# Patient Record
Sex: MALE | Race: WHITE | NOT HISPANIC OR LATINO | Employment: OTHER | URBAN - NONMETROPOLITAN AREA
[De-identification: names, ages, dates, MRNs, and addresses within clinical notes are randomized per-mention and may not be internally consistent; named-entity substitution may affect disease eponyms.]

---

## 2020-04-17 ENCOUNTER — APPOINTMENT (OUTPATIENT)
Dept: GENERAL RADIOLOGY | Facility: HOSPITAL | Age: 75
End: 2020-04-17
Attending: EMERGENCY MEDICINE
Payer: MEDICARE

## 2020-04-17 ENCOUNTER — ANESTHESIA EVENT (OUTPATIENT)
Dept: EMERGENCY MEDICINE | Facility: HOSPITAL | Age: 75
End: 2020-04-17
Payer: MEDICARE

## 2020-04-17 ENCOUNTER — APPOINTMENT (OUTPATIENT)
Dept: CT IMAGING | Facility: HOSPITAL | Age: 75
End: 2020-04-17
Attending: EMERGENCY MEDICINE
Payer: MEDICARE

## 2020-04-17 ENCOUNTER — HOSPITAL ENCOUNTER (EMERGENCY)
Facility: HOSPITAL | Age: 75
Discharge: HOME | End: 2020-04-17
Attending: EMERGENCY MEDICINE | Admitting: EMERGENCY MEDICINE
Payer: MEDICARE

## 2020-04-17 ENCOUNTER — ANESTHESIA (OUTPATIENT)
Dept: EMERGENCY MEDICINE | Facility: HOSPITAL | Age: 75
End: 2020-04-17
Payer: MEDICARE

## 2020-04-17 VITALS
DIASTOLIC BLOOD PRESSURE: 78 MMHG | OXYGEN SATURATION: 94 % | RESPIRATION RATE: 20 BRPM | HEART RATE: 53 BPM | SYSTOLIC BLOOD PRESSURE: 147 MMHG

## 2020-04-17 DIAGNOSIS — S43.004A DISLOCATION OF RIGHT SHOULDER JOINT, INITIAL ENCOUNTER: ICD-10-CM
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
## 2020-04-17 DIAGNOSIS — S09.90XA CLOSED HEAD INJURY, INITIAL ENCOUNTER: ICD-10-CM
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
## 2020-04-17 LAB
ALBUMIN SERPL-MCNC: 3.7 G/DL (ref 3.4–5)
ALP SERPL-CCNC: 70 U/L (ref 40–150)
ALT SERPL W P-5'-P-CCNC: 41 U/L (ref 0–70)
ANION GAP SERPL CALCULATED.3IONS-SCNC: 5 MMOL/L (ref 3–14)
AST SERPL W P-5'-P-CCNC: 37 U/L (ref 0–45)
BASOPHILS # BLD AUTO: 0.1 10E9/L (ref 0–0.2)
BASOPHILS NFR BLD AUTO: 0.7 %
BILIRUB SERPL-MCNC: 0.7 MG/DL (ref 0.2–1.3)
BUN SERPL-MCNC: 22 MG/DL (ref 7–30)
CALCIUM SERPL-MCNC: 8.2 MG/DL (ref 8.5–10.1)
CHLORIDE SERPL-SCNC: 112 MMOL/L (ref 94–109)
CO2 SERPL-SCNC: 24 MMOL/L (ref 20–32)
CREAT SERPL-MCNC: 1.17 MG/DL (ref 0.66–1.25)
DIFFERENTIAL METHOD BLD: ABNORMAL
EOSINOPHIL # BLD AUTO: 0.2 10E9/L (ref 0–0.7)
EOSINOPHIL NFR BLD AUTO: 2.5 %
ERYTHROCYTE [DISTWIDTH] IN BLOOD BY AUTOMATED COUNT: 13.5 % (ref 10–15)
GFR SERPL CREATININE-BSD FRML MDRD: 61 ML/MIN/{1.73_M2}
GLUCOSE BLDC GLUCOMTR-MCNC: 137 MG/DL (ref 70–99)
GLUCOSE SERPL-MCNC: 147 MG/DL (ref 70–99)
HCT VFR BLD AUTO: 40.8 % (ref 40–53)
HGB BLD-MCNC: 14.5 G/DL (ref 13.3–17.7)
IMM GRANULOCYTES # BLD: 0 10E9/L (ref 0–0.4)
IMM GRANULOCYTES NFR BLD: 0.3 %
INR PPP: 1.04 (ref 0.86–1.14)
LACTATE BLD-SCNC: 1.9 MMOL/L (ref 0.7–2)
LYMPHOCYTES # BLD AUTO: 2.5 10E9/L (ref 0.8–5.3)
LYMPHOCYTES NFR BLD AUTO: 35.4 %
MCH RBC QN AUTO: 34.4 PG (ref 26.5–33)
MCHC RBC AUTO-ENTMCNC: 35.5 G/DL (ref 31.5–36.5)
MCV RBC AUTO: 97 FL (ref 78–100)
MONOCYTES # BLD AUTO: 0.7 10E9/L (ref 0–1.3)
MONOCYTES NFR BLD AUTO: 10 %
NEUTROPHILS # BLD AUTO: 3.6 10E9/L (ref 1.6–8.3)
NEUTROPHILS NFR BLD AUTO: 51.1 %
NRBC # BLD AUTO: 0 10*3/UL
NRBC BLD AUTO-RTO: 0 /100
PLATELET # BLD AUTO: 189 10E9/L (ref 150–450)
POTASSIUM SERPL-SCNC: 3.6 MMOL/L (ref 3.4–5.3)
PROT SERPL-MCNC: 6.8 G/DL (ref 6.8–8.8)
RBC # BLD AUTO: 4.21 10E12/L (ref 4.4–5.9)
SODIUM SERPL-SCNC: 141 MMOL/L (ref 133–144)
WBC # BLD AUTO: 7.1 10E9/L (ref 4–11)

## 2020-04-17 PROCEDURE — 99203 OFFICE O/P NEW LOW 30 MIN: CPT | Performed by: SURGERY

## 2020-04-17 PROCEDURE — 23655 CLTX SHO DSLC W/MNPJ W/ANES: CPT | Mod: RT

## 2020-04-17 PROCEDURE — 68300002 ZZH FULL TRAUMA W/O CC LEVEL III

## 2020-04-17 PROCEDURE — 83605 ASSAY OF LACTIC ACID: CPT | Performed by: EMERGENCY MEDICINE

## 2020-04-17 PROCEDURE — 36415 COLL VENOUS BLD VENIPUNCTURE: CPT | Performed by: EMERGENCY MEDICINE

## 2020-04-17 PROCEDURE — 25000132 ZZH RX MED GY IP 250 OP 250 PS 637: Mod: GY | Performed by: EMERGENCY MEDICINE

## 2020-04-17 PROCEDURE — 70450 CT HEAD/BRAIN W/O DYE: CPT | Mod: TC

## 2020-04-17 PROCEDURE — 40000986 XR SHOULDER RT PORT G/E 2 VW

## 2020-04-17 PROCEDURE — 85025 COMPLETE CBC W/AUTO DIFF WBC: CPT | Performed by: EMERGENCY MEDICINE

## 2020-04-17 PROCEDURE — 99285 EMERGENCY DEPT VISIT HI MDM: CPT | Mod: 25

## 2020-04-17 PROCEDURE — 85610 PROTHROMBIN TIME: CPT | Mod: GZ | Performed by: EMERGENCY MEDICINE

## 2020-04-17 PROCEDURE — 99283 EMERGENCY DEPT VISIT LOW MDM: CPT | Mod: Z6 | Performed by: EMERGENCY MEDICINE

## 2020-04-17 PROCEDURE — 23655 CLTX SHO DSLC W/MNPJ W/ANES: CPT | Mod: 54 | Performed by: EMERGENCY MEDICINE

## 2020-04-17 PROCEDURE — 80053 COMPREHEN METABOLIC PANEL: CPT | Performed by: EMERGENCY MEDICINE

## 2020-04-17 PROCEDURE — 73020 X-RAY EXAM OF SHOULDER: CPT | Mod: TC,RT,76

## 2020-04-17 PROCEDURE — 72125 CT NECK SPINE W/O DYE: CPT | Mod: TC

## 2020-04-17 PROCEDURE — 73020 X-RAY EXAM OF SHOULDER: CPT | Mod: TC,RT

## 2020-04-17 PROCEDURE — 99100 ANES PT EXTEME AGE<1 YR&>70: CPT | Performed by: NURSE ANESTHETIST, CERTIFIED REGISTERED

## 2020-04-17 PROCEDURE — 00000146 ZZHCL STATISTIC GLUCOSE BY METER IP

## 2020-04-17 PROCEDURE — 23700 MNPJ ANES SHO JT FIXJ APRATS: CPT | Performed by: NURSE ANESTHETIST, CERTIFIED REGISTERED

## 2020-04-17 RX ORDER — VALSARTAN 80 MG/1
80 TABLET ORAL
COMMUNITY
Start: 2019-10-28

## 2020-04-17 RX ORDER — ACETAMINOPHEN 325 MG/1
650 TABLET ORAL ONCE
Status: COMPLETED | OUTPATIENT
Start: 2020-04-17 | End: 2020-04-17

## 2020-04-17 RX ORDER — ROSUVASTATIN CALCIUM 20 MG/1
20 TABLET, COATED ORAL
COMMUNITY
Start: 2019-10-25

## 2020-04-17 RX ORDER — NITROGLYCERIN 0.4 MG/1
0.4 TABLET SUBLINGUAL EVERY 5 MIN PRN
COMMUNITY
Start: 2018-10-12

## 2020-04-17 RX ORDER — PROPOFOL 10 MG/ML
INJECTION, EMULSION INTRAVENOUS
Status: DISCONTINUED
Start: 2020-04-17 | End: 2020-04-18 | Stop reason: HOSPADM

## 2020-04-17 RX ORDER — METOPROLOL SUCCINATE 25 MG/1
25 TABLET, EXTENDED RELEASE ORAL
COMMUNITY
Start: 2020-01-30

## 2020-04-17 RX ADMIN — ACETAMINOPHEN 650 MG: 325 TABLET, FILM COATED ORAL at 21:23

## 2020-04-17 NOTE — ED TRIAGE NOTES
Pt brought to the ER via [facility]. Pt on blood thinners. Fall from running. Right shoulder not in alignment. 100mcg fentanyl and 1 mg versed.

## 2020-04-17 NOTE — ED AVS SNAPSHOT
[facility]  [address] [address]  Phone:  [phone]                                    Devante Vital   MRN: [id number]    Department:  [facility]   Date of Visit:  4/17/2020           After Visit Summary Signature Page    I have received my discharge instructions, and my questions have been answered. I have discussed any challenges I see with this plan with the nurse or doctor.    ..........................................................................................................................................  Patient/Patient Representative Signature      ..........................................................................................................................................  Patient Representative Print Name and Relationship to Patient    ..................................................               ................................................  Date                                   Time    ..........................................................................................................................................  Reviewed by Signature/Title    ...................................................              ..............................................  Date                                               Time          22EPIC Rev 08/18

## 2020-04-18 ASSESSMENT — ENCOUNTER SYMPTOMS
ENDOCRINE NEGATIVE: 1
CARDIOVASCULAR NEGATIVE: 1
RESPIRATORY NEGATIVE: 1
GASTROINTESTINAL NEGATIVE: 1
FEVER: 0
CONSTITUTIONAL NEGATIVE: 1
NECK PAIN: 0
EYES NEGATIVE: 1
NECK STIFFNESS: 0
MYALGIAS: 0
HEMATOLOGIC/LYMPHATIC NEGATIVE: 1
CHILLS: 0
NEUROLOGICAL NEGATIVE: 1
PSYCHIATRIC NEGATIVE: 1
ALLERGIC/IMMUNOLOGIC NEGATIVE: 1
MUSCULOSKELETAL NEGATIVE: 1

## 2020-04-18 NOTE — ED PROVIDER NOTES
History     Chief Complaint   Patient presents with     Fall     HPI  Devante Vital is a 74 year old male who presents today with complaints of a fall.  Patient states that he was in his driveway chasing his wife when he fell to the floor.  There was no loss of consciousness.  Patient however injured his arm in the process.  Patient brought in by ambulance on a backboard board with his right arm stretched over the top of his head.  Patient stated he was unable to move his arm.  No previous episodes.  Patient otherwise has been at baseline state of health.  Allergies:  Allergies   Allergen Reactions     Lisinopril Cough       Problem List:    There are no active problems to display for this patient.       Past Medical History:    No past medical history on file.    Past Surgical History:    No past surgical history on file.    Family History:    No family history on file.    Social History:  Marital Status:   [2]  Social History     Tobacco Use     Smoking status: Not on file   Substance Use Topics     Alcohol use: Not on file     Drug use: Not on file        Medications:    metoprolol succinate ER (TOPROL-XL) 25 MG 24 hr tablet  nitroGLYcerin (NITROSTAT) 0.4 MG sublingual tablet  rosuvastatin (CRESTOR) 40 MG tablet  valsartan (DIOVAN) 80 MG tablet          Review of Systems   Constitutional: Negative.  Negative for chills and fever.   HENT: Negative.    Eyes: Negative.    Respiratory: Negative.    Cardiovascular: Negative.    Gastrointestinal: Negative.    Endocrine: Negative.    Genitourinary: Negative.    Musculoskeletal: Negative.  Negative for myalgias, neck pain and neck stiffness.   Skin: Negative.    Allergic/Immunologic: Negative.    Neurological: Negative.    Hematological: Negative.    Psychiatric/Behavioral: Negative.        Physical Exam   BP: 131/80  Pulse: 52  Heart Rate: 52  Resp: (!) 8  SpO2: 93 %      Physical Exam  Constitutional:       General: He is not in acute distress.      Appearance: He is normal weight. He is not toxic-appearing.   HENT:      Head: Normocephalic.      Comments: Abrasion noted over left forehead  Neck:      Comments: Patient in c-collar  Cardiovascular:      Rate and Rhythm: Normal rate and regular rhythm.   Pulmonary:      Effort: Pulmonary effort is normal. No respiratory distress.      Breath sounds: Normal breath sounds. No wheezing or rales.   Abdominal:      General: Abdomen is flat. There is no distension.      Tenderness: There is no abdominal tenderness. There is no right CVA tenderness, left CVA tenderness or guarding.   Musculoskeletal:      Comments: Arm held extended above shoulder with palpable bony protrusion noted in right axilla   Skin:     General: Skin is warm.      Capillary Refill: Capillary refill takes less than 2 seconds.   Neurological:      Mental Status: He is alert.   Psychiatric:         Mood and Affect: Mood normal.         Behavior: Behavior normal.         Thought Content: Thought content normal.         Judgment: Judgment normal.         ED Course         Results for orders placed or performed during the hospital encounter of 04/17/20 (from the past 24 hour(s))   CBC with platelets differential   Result Value Ref Range    WBC 7.1 4.0 - 11.0 10e9/L    RBC Count 4.21 (L) 4.4 - 5.9 10e12/L    Hemoglobin 14.5 13.3 - 17.7 g/dL    Hematocrit 40.8 40.0 - 53.0 %    MCV 97 78 - 100 fl    MCH 34.4 (H) 26.5 - 33.0 pg    MCHC 35.5 31.5 - 36.5 g/dL    RDW 13.5 10.0 - 15.0 %    Platelet Count 189 150 - 450 10e9/L    Diff Method Automated Method     % Neutrophils 51.1 %    % Lymphocytes 35.4 %    % Monocytes 10.0 %    % Eosinophils 2.5 %    % Basophils 0.7 %    % Immature Granulocytes 0.3 %    Nucleated RBCs 0 0 /100    Absolute Neutrophil 3.6 1.6 - 8.3 10e9/L    Absolute Lymphocytes 2.5 0.8 - 5.3 10e9/L    Absolute Monocytes 0.7 0.0 - 1.3 10e9/L    Absolute Eosinophils 0.2 0.0 - 0.7 10e9/L    Absolute Basophils 0.1 0.0 - 0.2 10e9/L    Abs Immature  Granulocytes 0.0 0 - 0.4 10e9/L    Absolute Nucleated RBC 0.0    INR   Result Value Ref Range    INR 1.04 0.86 - 1.14   Comprehensive metabolic panel   Result Value Ref Range    Sodium 141 133 - 144 mmol/L    Potassium 3.6 3.4 - 5.3 mmol/L    Chloride 112 (H) 94 - 109 mmol/L    Carbon Dioxide 24 20 - 32 mmol/L    Anion Gap 5 3 - 14 mmol/L    Glucose 147 (H) 70 - 99 mg/dL    Urea Nitrogen 22 7 - 30 mg/dL    Creatinine 1.17 0.66 - 1.25 mg/dL    GFR Estimate 61 >60 mL/min/[1.73_m2]    GFR Estimate If Black 71 >60 mL/min/[1.73_m2]    Calcium 8.2 (L) 8.5 - 10.1 mg/dL    Bilirubin Total 0.7 0.2 - 1.3 mg/dL    Albumin 3.7 3.4 - 5.0 g/dL    Protein Total 6.8 6.8 - 8.8 g/dL    Alkaline Phosphatase 70 40 - 150 U/L    ALT 41 0 - 70 U/L    AST 37 0 - 45 U/L   Lactic acid whole blood   Result Value Ref Range    Lactic Acid 1.9 0.7 - 2.0 mmol/L   Glucose by meter   Result Value Ref Range    Glucose 137 (H) 70 - 99 mg/dL   CT Head w/o Contrast    Narrative    PROCEDURE: CT HEAD W/O CONTRAST     HISTORY: s/p trauma with head injury and dislocated shoulder. r/o ich.    COMPARISON: None.    TECHNIQUE:  Helical images of the head from the foramen magnum to the  vertex were obtained without contrast.    FINDINGS: The ventricles and sulci are normal in volume. No acute  intracranial hemorrhage, mass effect, midline shift, hydrocephalus or  basilar cystern effacement are present.    The grey-white matter interface is preserved.    The calvarium is intact. The mastoid air cells are clear.  The  visualized paranasal sinuses are clear.      Impression    IMPRESSION: Normal brain for age      REJI GODFREY MD   Cervical spine CT w/o contrast    Narrative    PROCEDURE: CT CERVICAL SPINE W/O CONTRAST 4/17/2020 7:01 PM    HISTORY: R/o fx, s/p fall wih complaints of head pain. R/o fx.    COMPARISONS: None.    Meds/Dose Given:    TECHNIQUE: CT scan of the cervical spine with sagittal coronal  reconstructions    FINDINGS: There are degenerative  changes at the middle atlantoaxial  joint. There is decrease in height in the C4-C5 C5-C6 and C6-C7 discs  with anterior posterior osteophytes and bilateral uncovertebral joint  spurs. Cervical facet joint degenerative changes are seen most severe  at C7-T1 on the left. There are no fractures of the vertebral bodies  or arches. The paravertebral soft tissues appear normal.         Impression    IMPRESSION: Degenerative changes of the cervical spine    REJI GODFREY MD   XR Shoulder Right Port 1 View    Narrative    PROCEDURE:  XR SHOULDER RT PORT 1 VW    HISTORY: pain. dislocation?; Fall    COMPARISON:  None.    TECHNIQUE:  1 views of the right shoulder were obtained.    FINDINGS:  A single view of the shoulder reveals the glenohumeral  articulation is questionably subluxed inferiorly the acromioclavicular  joint appears normal.       Impression    IMPRESSION: Possible glenohumeral subluxation additional views of the  shoulder recommended      REJI GODFREY MD   XR Shoulder Right Port 1 View    Narrative    PROCEDURE:  XR SHOULDER RT PORT 1 VW    HISTORY: put back in place?; Fall    COMPARISON:  None.    TECHNIQUE:  1 views of the right shoulder were obtained.    FINDINGS:  There are degenerative changes in chromic thickened joint.  A single projection the glenohumeral articulation appears normally  aligned. No fractures are noted.       Impression    IMPRESSION: Glenohumeral alignment appears anatomic in this single  projection      REJI GODFREY MD   XR Shoulder Right Port G/E 2 Views    Narrative    PROCEDURE:  XR SHOULDER RT PORT G/E 2 VW    HISTORY: Post reduction x-rays s/p fall. R/o fx    COMPARISON:  None.    TECHNIQUE:  2 views of the right shoulder were obtained.    FINDINGS:  The glenohumeral articulation is normally aligned.  Degenerative changes are seen in the acromio clavicular joint. The  scapula and proximal humerus are intact.       Impression    IMPRESSION: Normal glenohumeral alignment.  No bony fractures.      REJI GODFREY MD       Medications   propofol (DIPRIVAN) 1000 MG/100ML infusion (has no administration in time range)       Assessments & Plan (with Medical Decision Making)     Patient came in as a level 2 trauma.  Upon presentation patient was noted to have what appeared to be a dislocation of his right shoulder and was upgraded to level 1.  Anesthesia arrived and patient was given propofol with reduction of his right arm by MD.  Patient immediately taken to CT for stat head and neck CT.  Both were negative.      Patient was observed for period of time during which post-reduction x-rays of his right shoulder with and showing no fracture.  Patient is to be discharged home with close follow-up with PCP and in 12 to 24 hours.  Patient understands to return if he develops any new or worsening symptoms. Post discharge, patient with intact neurovascular in right arm. Patient placed in right arm sling and referred to ortho for follow up.     Due to the nature of this electronic medical record, laboratory results, imaging results, diagnosis, other information and medications reported above may not represent information available to me at the the time of my care and disposition. Medications reported above may have not been ordered by me.     Portions of the record may have been created with voice recognition software. Occasional wrong-word or 'sound-a- like' substitution may have occurred due to the inherent limitations of voice recognition software. Though the chart has been reviewed, there may be inadvertent transcription errors. Read the chart carefully and recognize, using context, where substitutions have occurred.       New Prescriptions    No medications on file       Final diagnoses:   Fall       4/17/2020   [facility]     Adarsh Rico MD  04/21/20 2045       Adarsh Rico MD  04/21/20 2050

## 2020-04-18 NOTE — ED NOTES
MD Babin at bedside, cleared C-spine.  MD Babin removed C-collar. Pt alert, and communicating.  Removed 4 LPM NC, and on RA.

## 2020-04-18 NOTE — CONSULTS
[facility]    Consult note: Trauma Service     Date of Admission:  4/17/2020    Time of Admission/Consult Request (page/call): 1823    Time of my evaluation: 1832  Consulting services: None    Assessment & Plan   Trauma mechanism: Fall while running  Time/date of injury: 4/17/2020 ~615  Known Injuries:  1. Right shoulder dislocation  2. Right frontal scalp abrasion  Other diagnoses:   None    Procedure: None    Plan:  The patient's right shoulder dislocation was reduced by the ER physician. There were no traumatic injuries noted on CT imaing. As there were no traumatic findings found on the CT scan and his shoulder was reduced he is stable from a trauma surgical standpoint and can be discharged.     ETOH: This patient was asked if in the last 3-6 months there has been a time when he had  5 or more drinks in a single day/outing.. Patient answer to the screening question was in the negative. No intervention needed.  Primary Care Physician   No primary care provider on file.    Chief Complaint   Right shoulder pain    History is obtained from the patient    History of Present Illness   Devante Vital is a 74 year old male who presents with right shoulder pain. He says that he was running from his wife and fell with an out stretched arm. He fell and hit his head. He is currently on blood thinners for previous cardiac stents. He had no loss of concousness, dizzines, or change in vision. He has no head or neck pain. He has no chest pain or shortness of breath. He has had no numbness or tingling to the right arm. He has had no numbness or tingling to the left arm and bilateral lower legs. The patient came into the ER as a level I trauma.     Past Medical History    I have reviewed this patient's medical history and updated it with pertinent information if needed.   No past medical history on file.    Past Surgical History   I have reviewed this patient's surgical history and updated it with pertinent  information if needed.  No past surgical history on file.  Prior to Admission Medications   Prior to Admission Medications   Prescriptions Last Dose Informant Patient Reported? Taking?   metoprolol succinate ER (TOPROL-XL) 25 MG 24 hr tablet Unknown at Unknown time  Yes No   Sig: Take 25 mg by mouth   nitroGLYcerin (NITROSTAT) 0.4 MG sublingual tablet Unknown at Unknown time  Yes No   Sig: DISSOLVE ONE TABLET UNDER THE TONGUE EVERY 5 MINUTES AS NEEDED FOR CHEST PAIN.  DO NOT EXCEED A TOTAL OF 3 DOSES IN 15 MINUTES   rosuvastatin (CRESTOR) 40 MG tablet Unknown at Unknown time  Yes No   Sig: TAKE 1/2 (ONE-HALF) TABLET BY MOUTH ONCE DAILY   valsartan (DIOVAN) 80 MG tablet Unknown at Unknown time  Yes No   Sig: Take 80 mg by mouth      Facility-Administered Medications: None     Allergies   Allergies   Allergen Reactions     Lisinopril Cough       Social History   Social History     Socioeconomic History     Marital status:      Spouse name: Not on file     Number of children: Not on file     Years of education: Not on file     Highest education level: Not on file   Occupational History     Not on file   Social Needs     Financial resource strain: Not on file     Food insecurity     Worry: Not on file     Inability: Not on file     Transportation needs     Medical: Not on file     Non-medical: Not on file   Tobacco Use     Smoking status: Not on file   Substance and Sexual Activity     Alcohol use: Not on file     Drug use: Not on file     Sexual activity: Not on file   Lifestyle     Physical activity     Days per week: Not on file     Minutes per session: Not on file     Stress: Not on file   Relationships     Social connections     Talks on phone: Not on file     Gets together: Not on file     Attends Spiritism service: Not on file     Active member of club or organization: Not on file     Attends meetings of clubs or organizations: Not on file     Relationship status: Not on file     Intimate partner violence      Fear of current or ex partner: Not on file     Emotionally abused: Not on file     Physically abused: Not on file     Forced sexual activity: Not on file   Other Topics Concern     Not on file   Social History Narrative     Not on file       Family History   I have reviewed this patient's family history and updated it with pertinent information if needed.   No family history on file.    Review of Systems   CONSTITUTIONAL: No fever, chills, sweats, fatigue   EYES: no visual blurring, no double vision or visual loss  ENT: no decrease in hearing, no tinnitus, no vertigo, no hoarseness  RESPIRATORY: no shortness of breath, no cough, no sputum   CARDIOVASCULAR: no palpitations, no chest  pain, no exertional chest pain or pressure  GASTROINTESTINAL: no nausea or vomiting, or abd pain  GENITOURINARY: no dysuria, no frequency or hesitancy, no hematuria  MUSCULOSKELETAL: no weakness, no redness, no swelling, no joint pain,   SKIN: no rashes, ecchymoses, abrasions or lacerations  NEUROLOGIC: no numbness or tingling of hands, no numbness or tingling  of feet, no syncope, no tremors or weakness  PSYCHIATRIC: no sleep disturbances, no anxiety or depression    Physical Exam       BP: 136/99 Pulse: (!) 48 Heart Rate: 59 Resp: (!) 29 SpO2: (!) 88 % O2 Device: None (Room air)    Vital Signs with Ranges  Pulse:  [48-54] 48  Heart Rate:  [49-59] 59  Resp:  [7-29] 29  BP: (126-150)/() 136/99  SpO2:  [88 %-96 %] 88 % 0 lbs 0 oz    # Pain Assessment:  Current Pain Score 4/17/2020   Pain score (0-10) 9     Primary Survey:  Airway: patient talking  Breathing: symmetric respiratory effort bilaterally  Circulation: central pulses present and peripheral pulses present  Disability: Pupils - left 4 mm and brisk, right 4 mm and brisk     [place] Coma Scale - Total 15/15  Eye Response (E): 4  4= spontaneous,  3= to verbal/voice, 2=  to pain, 1= No response   Verbal Response (V): 5   5= Orientated, converses,  4= Confused, converses, 3=  Inappropriate words,  2= Incomprehensible sounds,  1=No response   Motor Response (M): 6   6= Obeys commands, 5= Localizes to pain, 4= Withdrawal to pain, 3=Fexion to pain, 2= Extension to pain, 1= No response    Secondary Survey:  General: alert, oriented to person, place, time  Head: mild right front scalp abrasion, normocephalic, trachea midline  Eyes: PERRLA, pupils 3 mm, EOMI, corneas and conjunctivae clear  Ears: bilateral TMs occluded with cerumen and non-inflamed external ear canals  Nose: nares patent, no drainage, nasal septum non-tender  Mouth/Throat: no exudates or erythema,  no dental tenderness or malocclusions, no tongue lacerations  Neck:  cervical collar present. No midline posterior tenderness  Chest/Pulmonary: normal respiratory rate and rhythm,  bilateral clear breath sounds, no wheezes, rales or rhonchi, no chest wall tenderness or deformities,   Cardiovascular: S1, S2,  normal and regular rate and rhythm, no murmurs  Abdomen: soft, non-tender, no guarding, no rebound tenderness and no tenderness to palpation  : pelvis stable to lateral compression, no nino, no urine assess  Back/Spine: no deformity, no midline tenderness, no sacral tenderness,  no step-offs and no abrasions or contusions  Musculoskel/Extremities: RIGHT upper extremity with anterior shoulder dislocation, upper arm is abducted, there is no numbness or tingling to the arm, Left upper and bilateral lower normal extremities, full AROM of major joints without tenderness, edema, erythema, ecchymosis, or abrasions.   Hand: no gross deformities of hands or fingers. Full AROM of hand and fingers in flexion and extension.  strength equal and symmetric.   Skin: no rashes, laceration, ecchymosis, skin warm and dry.   Neuro: PERRLA, alert, oriented x 3. CN II-XII grossly intact. No focal deficits. Strength 5/5 x 4 extremities.  Sensation intact.  Psychiatric: affect/mood normal, cooperative, normal judgement/insight and memory  intact    Data       Studies:  XR Shoulder Right Port G/E 2 Views   Final Result   IMPRESSION: Normal glenohumeral alignment. No bony fractures.        REJI GODFREY MD      XR Shoulder Right Port 1 View   Final Result   IMPRESSION: Glenohumeral alignment appears anatomic in this single   projection        REJI GODFREY MD      XR Shoulder Right Port 1 View   Final Result   IMPRESSION: Possible glenohumeral subluxation additional views of the   shoulder recommended        REJI GODFREY MD      Cervical spine CT w/o contrast   Final Result   IMPRESSION: Degenerative changes of the cervical spine      REJI GODFREY MD      CT Head w/o Contrast   Final Result   IMPRESSION: Normal brain for age        MD Masood JOINER

## 2020-04-18 NOTE — DISCHARGE INSTRUCTIONS
1) Follow up with your doctor in 12 to 24 hours  2) Follow the aftercare instructions provided.

## 2020-04-18 NOTE — ED NOTES
Nba from security given the contraband that was found in patient's hand. A substance the size of a pea wrapped in a ziploc bag.

## 2020-05-30 ENCOUNTER — ALLIED HEALTH/NURSE VISIT (OUTPATIENT)
Dept: FAMILY MEDICINE | Facility: OTHER | Age: 75
End: 2020-05-30
Attending: OPHTHALMOLOGY
Payer: MEDICARE

## 2020-05-30 DIAGNOSIS — Z20.822 COVID-19 RULED OUT: Primary | ICD-10-CM

## 2020-05-30 PROCEDURE — 87635 SARS-COV-2 COVID-19 AMP PRB: CPT | Mod: ZL | Performed by: OPHTHALMOLOGY

## 2020-05-30 PROCEDURE — 99207 ZZC NO CHARGE NURSE ONLY: CPT

## 2020-06-01 LAB
SARS-COV-2 RNA SPEC QL NAA+PROBE: NOT DETECTED
SPECIMEN SOURCE: NORMAL

## 2020-06-21 ENCOUNTER — IMMUNIZATION (OUTPATIENT)
Dept: FAMILY MEDICINE | Facility: OTHER | Age: 75
End: 2020-06-21
Attending: FAMILY MEDICINE
Payer: MEDICARE

## 2020-06-21 DIAGNOSIS — Z01.818 PREOP GENERAL PHYSICAL EXAM: Primary | ICD-10-CM

## 2020-06-21 PROCEDURE — U0003 INFECTIOUS AGENT DETECTION BY NUCLEIC ACID (DNA OR RNA); SEVERE ACUTE RESPIRATORY SYNDROME CORONAVIRUS 2 (SARS-COV-2) (CORONAVIRUS DISEASE [COVID-19]), AMPLIFIED PROBE TECHNIQUE, MAKING USE OF HIGH THROUGHPUT TECHNOLOGIES AS DESCRIBED BY CMS-2020-01-R: HCPCS | Mod: ZL | Performed by: FAMILY MEDICINE

## 2020-06-21 NOTE — LETTER
June 23, 2020        Devante ALEXUS Zahraa  [address] [place] [address]    This letter provides a written record that you were tested for COVID-19 on 6/21/20.       Your result was negative. This means that we didn t find the virus that causes COVID-19 in your sample. A test may show negative when you do actually have the virus. This can happen when the virus is in the early stages of infection, before you feel illness symptoms.    If you have symptoms   Stay home and away from others (self-isolate) until you meet ALL of the guidelines below:    You ve had no fever--and no medicine that reduces fever--for 3 full days (72 hours). And      Your other symptoms have gotten better. For example, your cough or breathing has improved. And     At least 10 days have passed since your symptoms started.    During this time:    Stay home. Don t go to work, school or anywhere else.     Stay in your own room, including for meals. Use your own bathroom if you can.    Stay away from others in your home. No hugging, kissing or shaking hands. No visitors.    Clean  high touch  surfaces often (doorknobs, counters, handles, etc.). Use a household cleaning spray or wipes. You can find a full list on the EPA website at www.epa.gov/pesticide-registration/list-n-disinfectants-use-against-sars-cov-2.    Cover your mouth and nose with a mask, tissue or washcloth to avoid spreading germs.    Wash your hands and face often with soap and water.    Going back to work  Check with your employer for any guidelines to follow for going back to work.    Employers: This document serves as formal notice that your employee tested negative for COVID-19, as of the testing date shown above.

## 2020-06-22 ENCOUNTER — TELEPHONE (OUTPATIENT)
Dept: EDUCATION SERVICES | Facility: HOSPITAL | Age: 75
End: 2020-06-22

## 2020-06-22 LAB
SARS-COV-2 RNA SPEC QL NAA+PROBE: NOT DETECTED
SPECIMEN SOURCE: NORMAL

## 2020-06-22 NOTE — TELEPHONE ENCOUNTER
Pt called he was contacted by [facility] yesterday after his preop COVID testing and was told to call back. Test is in process, no notes recorded stating why he was called.

## 2020-06-24 ENCOUNTER — TELEPHONE (OUTPATIENT)
Dept: FAMILY MEDICINE | Facility: OTHER | Age: 75
End: 2020-06-24

## 2021-02-24 ENCOUNTER — APPOINTMENT (OUTPATIENT)
Dept: GENERAL RADIOLOGY | Facility: HOSPITAL | Age: 76
End: 2021-02-24
Attending: FAMILY MEDICINE
Payer: MEDICARE

## 2021-02-24 ENCOUNTER — HOSPITAL ENCOUNTER (EMERGENCY)
Facility: HOSPITAL | Age: 76
Discharge: HOME | End: 2021-02-24
Attending: FAMILY MEDICINE | Admitting: FAMILY MEDICINE
Payer: MEDICARE

## 2021-02-24 VITALS
DIASTOLIC BLOOD PRESSURE: 76 MMHG | SYSTOLIC BLOOD PRESSURE: 140 MMHG | RESPIRATION RATE: 20 BRPM | OXYGEN SATURATION: 96 % | TEMPERATURE: 96.9 F | HEART RATE: 52 BPM

## 2021-02-24 DIAGNOSIS — S20.212A RIB CONTUSION, LEFT, INITIAL ENCOUNTER: ICD-10-CM

## 2021-02-24 PROCEDURE — 99283 EMERGENCY DEPT VISIT LOW MDM: CPT | Performed by: FAMILY MEDICINE

## 2021-02-24 PROCEDURE — 99283 EMERGENCY DEPT VISIT LOW MDM: CPT | Mod: 25

## 2021-02-24 PROCEDURE — 71046 X-RAY EXAM CHEST 2 VIEWS: CPT

## 2021-02-24 PROCEDURE — 71100 X-RAY EXAM RIBS UNI 2 VIEWS: CPT | Mod: LT

## 2021-02-24 ASSESSMENT — ENCOUNTER SYMPTOMS
SHORTNESS OF BREATH: 0
WHEEZING: 0
COUGH: 0
MUSCULOSKELETAL NEGATIVE: 1
CONSTITUTIONAL NEGATIVE: 1
GASTROINTESTINAL NEGATIVE: 1
CHEST TIGHTNESS: 0

## 2021-02-25 NOTE — ED PROVIDER NOTES
"  History     Chief Complaint   Patient presents with     Fall     fell on ice and left arm hit left upper rib area. Pain with inspiration. Takes apixaban     HPI  Devante Vital is a 75 year old male with medical problems as noted below who after falling on his left side.  He stated he just lost his balance on the ice and he tucked his left arm under and landed more so on his left arm but is concerned because he is on Eliquis and he is worried that he might be bleeding inside.  He noticed that the wind was \"knocked out of me\" but that is actually resolved since he has been here in the emergency room.  He has some discomfort with movement but it is not severe.  He is just concerned about the possibility of bleeding.  Denies any symptoms in his left elbow or left arm.He did not hit his head. No LOC.  Has some discomfort in his left shoulder but he thinks that is from overusing it as he put his right elbow out about a month ago.    Allergies:  Allergies   Allergen Reactions     Lisinopril Cough       Problem List:    There are no active problems to display for this patient.       Past Medical History:    History reviewed. No pertinent past medical history.    Past Surgical History:    History reviewed. No pertinent surgical history.    Family History:    History reviewed. No pertinent family history.  Problem List: Copied from [facility] chart. 5/27/2020  Patient Active Problem List   Diagnosis     Mixed hyperlipidemia     Coronary atherosclerosis of native coronary artery     H/O heart artery stent     Benign essential HTN     Chronic atrial fibrillation     Past Medical History:  See pre-operative risk assessment above  Past Medical History:   Diagnosis Date     Aneurysm of unspecified site (HCC) 08/07/2010     Calculus of kidney 05/09/1999     Contact dermatitis and other eczema, due to unspecified cause 03/01/2000     Coronary atherosclerosis of native coronary artery 08/06/02010     Dyspepsia and other " specified disorders of function of stomach 08/21/2001     Enthesopathy of ankle and tarsus, unspecified 09/19/1999     Ganglion, unspecified 07/01/1999     Herpes zoster without mention of complication 10/16/2001     Intermediate coronary syndrome (HCC) 08/06/02010     Open wound of finger(s) , with tendon involvement 07/23/1999     Other general symptoms(780.99) 11/08/1999     Personal history of alcoholism (HCC) 07/01/1999     Renal colic 05/10/1999     Stomatitis and mucositis (ulcerative) 07/26/2001     Unstable angina (HCC) 08/06/2010     Past Surgical/Anesthesia History:  Past Surgical History:   Procedure Laterality Date     CHEST X-RAY - RADIOLOGY 08/21/2001     COLONOSCOPY 11/11/2011   Sigmoid and descending colon diverticula. Internal and external hemorrhoids.     CYSTOSCOPY,FRAGMT URETERAL STONE 05/10/1999   With stent placement     HEMORRHOIDECTOMY,INT/EXT,SIMPLE   ?int/ext     INSERT INTRACORONARY STENT 08/06/02010     INSERT INTRACORONARY STENT,ADDNL 08/06/02010     MUSCULOSKELETAL PROCEDURE 07/23/1999   Excision ganglion, right finger     MUSCULOSKELETAL PROCEDURE   Repair of lacerated tendon of index finger     PSEUDOANEURYSM INJECTION TRT 08/07/2010     Social History:  Marital Status:   [2]  Social History     Tobacco Use     Smoking status: None   Substance Use Topics     Alcohol use: None     Drug use: None      Medications:    apixaban ANTICOAGULANT (ELIQUIS) 5 MG tablet  metoprolol succinate ER (TOPROL-XL) 25 MG 24 hr tablet  nitroGLYcerin (NITROSTAT) 0.4 MG sublingual tablet  rosuvastatin (CRESTOR) 40 MG tablet  valsartan (DIOVAN) 80 MG tablet      Review of Systems   Constitutional: Negative.    HENT: Negative.    Respiratory: Negative for cough, chest tightness, shortness of breath and wheezing.    Cardiovascular: Positive for chest pain.   Gastrointestinal: Negative.    Genitourinary: Negative.    Musculoskeletal: Negative.        Physical Exam   BP: 124/81  Pulse: 50  Temp: 96.9  F  (36.1  C)  Resp: 20  SpO2: 95 %  Physical Exam  Constitutional:       General: He is not in acute distress.     Appearance: Normal appearance. He is obese. He is not ill-appearing.   HENT:      Head: Normocephalic and atraumatic.   Neck:      Musculoskeletal: Normal range of motion and neck supple.   Cardiovascular:      Rate and Rhythm: Normal rate and regular rhythm.      Pulses: Normal pulses.      Heart sounds: Normal heart sounds.   Pulmonary:      Effort: Pulmonary effort is normal. No respiratory distress.      Breath sounds: Normal breath sounds. No wheezing, rhonchi or rales.   Abdominal:      General: Abdomen is flat. Bowel sounds are normal. There is no distension.      Palpations: Abdomen is soft.      Tenderness: There is no abdominal tenderness.   Skin:     General: Skin is warm and dry.      Capillary Refill: Capillary refill takes less than 2 seconds.   Neurological:      General: No focal deficit present.      Mental Status: He is alert.         ED Course   Patient seen and examined.  X-rays ordered.  X-rays negative for fracture.  Patient did have a little bit of crackles at the bases of both lungs I suspect he has a little of basilar atelectasis we will have him make sure he is taking good deep breaths and if he has fever chills or angina symptoms he should return to the ER.  This point I do not think he has any evidence of an infection.  Discussed results with patient and he is comfortable with discharge at this time.  Instructed him to take some deep breaths intermittently every hour as when he is awake and follow-up if he has fever chills or any other change in symptoms.     Procedures     Results for orders placed or performed during the hospital encounter of 02/24/21 (from the past 24 hour(s))   XR Ribs Left 2 Views    Narrative    XR RIBS LT 2 VW    HISTORY: 75 years Male fall onto left ribs    COMPARISON: Chest x-ray 5 2010    TECHNIQUE: 3 views left ribs were obtained.    FINDINGS: There  is no evidence of left rib fracture.      Impression    IMPRESSION: No evidence of left rib fracture.    LIZZ MARTINI MD   Chest XR,  PA & LAT    Narrative    XR CHEST 2 VW    HISTORY: 75 years Male fall onto left side    COMPARISON: 2/24/2021    TECHNIQUE: 2 views of the chest were obtained.    FINDINGS: Lung volumes are low. There is subtle patchy opacity at both  lung bases.    Heart size and pulmonary vascularity are within normal limits. Lungs  otherwise clear. There is no evidence of pneumothorax or hemothorax.      Impression    IMPRESSION: Low lung volumes. Subtle patchy bilateral basilar opacity  is present. Suspect atelectasis. Pneumonia cannot be excluded.    LIZZ MARTINI MD       Medications - No data to display    Assessments & Plan (with Medical Decision Making)     I have reviewed the nursing notes.    I have reviewed the findings, diagnosis, plan and need for follow up with the patient.    New Prescriptions    No medications on file       Final diagnoses:   Rib contusion, left, initial encounter       2/24/2021   [facility]     Orly Arana MD  02/24/21 [id number]

## 2021-02-25 NOTE — ED NOTES
Pt ambulatory to ED room 5 with c/o pain to the left side of his ribs and sharp intermittent chest pain after falling on the ice this evening. Pt states that his left arm fell underneath him and pressed in the rib area when he fell. Slight bruising noted under left breast. Pt denies SOB and did not hit his head. Pt does take apixaban for atrial fibrilartion. Pt alert and oriented.

## 2021-02-25 NOTE — ED NOTES
Patient given discharge instructions to take tylenol, heat and ice area for discomfort. Given discharge packet to follow up for new or worsening symptoms.     Patient verbalized understanding. Patient condition improved upon discharge.

## 2022-04-10 ENCOUNTER — APPOINTMENT (OUTPATIENT)
Dept: GENERAL RADIOLOGY | Facility: HOSPITAL | Age: 77
End: 2022-04-10
Attending: STUDENT IN AN ORGANIZED HEALTH CARE EDUCATION/TRAINING PROGRAM
Payer: MEDICARE

## 2022-04-10 ENCOUNTER — HOSPITAL ENCOUNTER (OUTPATIENT)
Facility: HOSPITAL | Age: 77
Setting detail: OBSERVATION
Discharge: ANOTHER HOSPITAL | End: 2022-04-11
Attending: STUDENT IN AN ORGANIZED HEALTH CARE EDUCATION/TRAINING PROGRAM | Admitting: INTERNAL MEDICINE
Payer: MEDICARE

## 2022-04-10 DIAGNOSIS — R00.1 SYMPTOMATIC BRADYCARDIA: ICD-10-CM

## 2022-04-10 PROBLEM — E78.5 DYSLIPIDEMIA: Status: ACTIVE | Noted: 2022-04-10

## 2022-04-10 PROBLEM — I25.10 CORONARY ARTERY DISEASE INVOLVING NATIVE CORONARY ARTERY OF NATIVE HEART WITHOUT ANGINA PECTORIS: Status: ACTIVE | Noted: 2022-04-10

## 2022-04-10 PROBLEM — R73.01 IMPAIRED FASTING GLUCOSE: Status: ACTIVE | Noted: 2022-04-10

## 2022-04-10 LAB
ANION GAP SERPL CALCULATED.3IONS-SCNC: 3 MMOL/L (ref 3–14)
BASOPHILS # BLD AUTO: 0 10E3/UL (ref 0–0.2)
BASOPHILS NFR BLD AUTO: 1 %
BUN SERPL-MCNC: 25 MG/DL (ref 7–30)
CALCIUM SERPL-MCNC: 8.9 MG/DL (ref 8.5–10.1)
CHLORIDE BLD-SCNC: 108 MMOL/L (ref 94–109)
CO2 SERPL-SCNC: 28 MMOL/L (ref 20–32)
CREAT SERPL-MCNC: 1.01 MG/DL (ref 0.66–1.25)
EOSINOPHIL # BLD AUTO: 0.1 10E3/UL (ref 0–0.7)
EOSINOPHIL NFR BLD AUTO: 3 %
ERYTHROCYTE [DISTWIDTH] IN BLOOD BY AUTOMATED COUNT: 13.2 % (ref 10–15)
GFR SERPL CREATININE-BSD FRML MDRD: 77 ML/MIN/1.73M2
GLUCOSE BLD-MCNC: 98 MG/DL (ref 70–99)
HCT VFR BLD AUTO: 40.4 % (ref 40–53)
HGB BLD-MCNC: 14 G/DL (ref 13.3–17.7)
HOLD SPECIMEN: NORMAL
IMM GRANULOCYTES # BLD: 0 10E3/UL
IMM GRANULOCYTES NFR BLD: 0 %
LYMPHOCYTES # BLD AUTO: 1.1 10E3/UL (ref 0.8–5.3)
LYMPHOCYTES NFR BLD AUTO: 26 %
MAGNESIUM SERPL-MCNC: 2.4 MG/DL (ref 1.6–2.3)
MCH RBC QN AUTO: 35.2 PG (ref 26.5–33)
MCHC RBC AUTO-ENTMCNC: 34.7 G/DL (ref 31.5–36.5)
MCV RBC AUTO: 102 FL (ref 78–100)
MONOCYTES # BLD AUTO: 0.4 10E3/UL (ref 0–1.3)
MONOCYTES NFR BLD AUTO: 10 %
NEUTROPHILS # BLD AUTO: 2.7 10E3/UL (ref 1.6–8.3)
NEUTROPHILS NFR BLD AUTO: 60 %
NRBC # BLD AUTO: 0 10E3/UL
NRBC BLD AUTO-RTO: 0 /100
PLATELET # BLD AUTO: 137 10E3/UL (ref 150–450)
POTASSIUM BLD-SCNC: 3.7 MMOL/L (ref 3.4–5.3)
RBC # BLD AUTO: 3.98 10E6/UL (ref 4.4–5.9)
SARS-COV-2 RNA RESP QL NAA+PROBE: NEGATIVE
SODIUM SERPL-SCNC: 139 MMOL/L (ref 133–144)
TROPONIN I SERPL HS-MCNC: 11 NG/L
TSH SERPL DL<=0.005 MIU/L-ACNC: 3.31 MU/L (ref 0.4–4)
WBC # BLD AUTO: 4.4 10E3/UL (ref 4–11)

## 2022-04-10 PROCEDURE — 80048 BASIC METABOLIC PNL TOTAL CA: CPT | Performed by: STUDENT IN AN ORGANIZED HEALTH CARE EDUCATION/TRAINING PROGRAM

## 2022-04-10 PROCEDURE — U0003 INFECTIOUS AGENT DETECTION BY NUCLEIC ACID (DNA OR RNA); SEVERE ACUTE RESPIRATORY SYNDROME CORONAVIRUS 2 (SARS-COV-2) (CORONAVIRUS DISEASE [COVID-19]), AMPLIFIED PROBE TECHNIQUE, MAKING USE OF HIGH THROUGHPUT TECHNOLOGIES AS DESCRIBED BY CMS-2020-01-R: HCPCS | Performed by: STUDENT IN AN ORGANIZED HEALTH CARE EDUCATION/TRAINING PROGRAM

## 2022-04-10 PROCEDURE — G0378 HOSPITAL OBSERVATION PER HR: HCPCS

## 2022-04-10 PROCEDURE — 99220 PR INITIAL OBSERVATION CARE,LEVEL III: CPT | Performed by: INTERNAL MEDICINE

## 2022-04-10 PROCEDURE — 36415 COLL VENOUS BLD VENIPUNCTURE: CPT | Performed by: STUDENT IN AN ORGANIZED HEALTH CARE EDUCATION/TRAINING PROGRAM

## 2022-04-10 PROCEDURE — C9803 HOPD COVID-19 SPEC COLLECT: HCPCS

## 2022-04-10 PROCEDURE — 84443 ASSAY THYROID STIM HORMONE: CPT | Performed by: STUDENT IN AN ORGANIZED HEALTH CARE EDUCATION/TRAINING PROGRAM

## 2022-04-10 PROCEDURE — 93010 ELECTROCARDIOGRAM REPORT: CPT | Performed by: INTERNAL MEDICINE

## 2022-04-10 PROCEDURE — 99285 EMERGENCY DEPT VISIT HI MDM: CPT | Performed by: STUDENT IN AN ORGANIZED HEALTH CARE EDUCATION/TRAINING PROGRAM

## 2022-04-10 PROCEDURE — 83735 ASSAY OF MAGNESIUM: CPT | Performed by: STUDENT IN AN ORGANIZED HEALTH CARE EDUCATION/TRAINING PROGRAM

## 2022-04-10 PROCEDURE — 85025 COMPLETE CBC W/AUTO DIFF WBC: CPT | Performed by: STUDENT IN AN ORGANIZED HEALTH CARE EDUCATION/TRAINING PROGRAM

## 2022-04-10 PROCEDURE — 99285 EMERGENCY DEPT VISIT HI MDM: CPT | Mod: 25

## 2022-04-10 PROCEDURE — 93005 ELECTROCARDIOGRAM TRACING: CPT

## 2022-04-10 PROCEDURE — 84484 ASSAY OF TROPONIN QUANT: CPT | Performed by: STUDENT IN AN ORGANIZED HEALTH CARE EDUCATION/TRAINING PROGRAM

## 2022-04-10 PROCEDURE — 71045 X-RAY EXAM CHEST 1 VIEW: CPT

## 2022-04-10 RX ORDER — DEXTROSE MONOHYDRATE 25 G/50ML
25-50 INJECTION, SOLUTION INTRAVENOUS
Status: DISCONTINUED | OUTPATIENT
Start: 2022-04-10 | End: 2022-04-11 | Stop reason: HOSPADM

## 2022-04-10 RX ORDER — ONDANSETRON 4 MG/1
4 TABLET, ORALLY DISINTEGRATING ORAL EVERY 6 HOURS PRN
Status: DISCONTINUED | OUTPATIENT
Start: 2022-04-10 | End: 2022-04-11 | Stop reason: HOSPADM

## 2022-04-10 RX ORDER — NICOTINE POLACRILEX 4 MG
15-30 LOZENGE BUCCAL
Status: DISCONTINUED | OUTPATIENT
Start: 2022-04-10 | End: 2022-04-11 | Stop reason: HOSPADM

## 2022-04-10 RX ORDER — ONDANSETRON 2 MG/ML
4 INJECTION INTRAMUSCULAR; INTRAVENOUS EVERY 6 HOURS PRN
Status: DISCONTINUED | OUTPATIENT
Start: 2022-04-10 | End: 2022-04-11 | Stop reason: HOSPADM

## 2022-04-11 VITALS
WEIGHT: 166.01 LBS | HEART RATE: 49 BPM | HEIGHT: 65 IN | OXYGEN SATURATION: 95 % | SYSTOLIC BLOOD PRESSURE: 124 MMHG | DIASTOLIC BLOOD PRESSURE: 68 MMHG | BODY MASS INDEX: 27.66 KG/M2 | RESPIRATION RATE: 14 BRPM | TEMPERATURE: 97.7 F

## 2022-04-11 LAB
GLUCOSE BLDC GLUCOMTR-MCNC: 108 MG/DL (ref 70–99)
GLUCOSE BLDC GLUCOMTR-MCNC: 89 MG/DL (ref 70–99)

## 2022-04-11 PROCEDURE — G0378 HOSPITAL OBSERVATION PER HR: HCPCS

## 2022-04-11 PROCEDURE — 250N000013 HC RX MED GY IP 250 OP 250 PS 637: Performed by: INTERNAL MEDICINE

## 2022-04-11 PROCEDURE — 99217 PR OBSERVATION CARE DISCHARGE: CPT | Performed by: INTERNAL MEDICINE

## 2022-04-11 PROCEDURE — 82962 GLUCOSE BLOOD TEST: CPT | Mod: 91

## 2022-04-11 RX ORDER — NITROGLYCERIN 0.4 MG/1
0.4 TABLET SUBLINGUAL EVERY 5 MIN PRN
Status: ON HOLD | COMMUNITY
Start: 2021-05-17 | End: 2022-04-11

## 2022-04-11 RX ORDER — CHLORAL HYDRATE 500 MG
2 CAPSULE ORAL DAILY
COMMUNITY

## 2022-04-11 RX ORDER — ASPIRIN 81 MG/1
81 TABLET ORAL
COMMUNITY

## 2022-04-11 RX ADMIN — APIXABAN 5 MG: 5 TABLET, FILM COATED ORAL at 00:50

## 2022-04-11 NOTE — ED PROVIDER NOTES
History     Chief Complaint   Patient presents with     Bradycardia     HPI  Devante Vital is a 76 year old male with history of atrial fibrillation who presents to the emergency department today complaining of lightheadedness and a slow heart rate.  He states his heart rates been in the 30s to 40s, that the symptoms started around the same time his heart rate became so low.  This is happened intermittently over the last couple weeks but started and has not gone away.  Is been ongoing for a few hours since prior to arrival.  No chest pain or shortness of breath just lightheadedness when he tries to ambulate.  No abdominal pain nausea vomiting or diarrhea.  No cough no fevers, no urinary symptoms.  No other complaints at this time.    Allergies:  Allergies   Allergen Reactions     Lisinopril Cough       Problem List:    Patient Active Problem List    Diagnosis Date Noted     Symptomatic bradycardia 04/10/2022     Priority: Medium        Past Medical History:    No past medical history on file.    Past Surgical History:    No past surgical history on file.    Family History:    No family history on file.    Social History:  Marital Status:   [2]        Medications:    apixaban ANTICOAGULANT (ELIQUIS) 5 MG tablet  metoprolol succinate ER (TOPROL-XL) 25 MG 24 hr tablet  nitroGLYcerin (NITROSTAT) 0.4 MG sublingual tablet  rosuvastatin (CRESTOR) 40 MG tablet  valsartan (DIOVAN) 80 MG tablet          Review of Systems  A complete review of systems was performed and is otherwise negative.     Physical Exam   BP: 131/76  Pulse: (!) 40  Temp: 98  F (36.7  C)  Resp: 20  SpO2: 96 %      Physical Exam  Constitutional: Alert and conversant. NAD   HENT: NCAT   Eyes: Normal pupils   Neck: supple   CV: bradycardic rate, regular rhythm, no murmur   Pulmonary/Chest: Non-labored respirations, clear to auscultation bilaterally   Abdominal: Soft, non-tender, non-distended   MSK: MACKEY.   Neuro: Alert and appropriate   Skin:  Warm and dry. No diaphoresis. No rashes on exposed skin    Psych: Appropriate mood and affect     ED Course              ED Course as of 04/10/22 2242   Sun Apr 10, 2022   2101 Patient here bradycardic and lightheaded.  Considering ACS, EKG here with a bradycardic sinus rhythm no signs of acute ischemia, QRS is narrow and the P wave is visible, doubt hyperkalemia, no chest pain but the patient does get lightheaded and short of breath when he moves around, troponin underway, electrolyte dyscrasias possible, pending laboratory evaluation.  No signs of anemia on his labs.  Chest x-ray within normal limits with no concerning findings.  Patient placed on her monitor, blood pressures normal primarily 130s to 140s.  No tachycardia or bradycardia noticed on our monitor thus far, not complaining of headache and he does not have a significant increase in blood pressure to suggest increased ICP.  No abdominal pain to suggest some sort of intra-abdominal hemorrhage driving this.   2103 Patient denies any recent changes in his beta-blocker, he is on metoprolol, he has not had his dose yet today and the only takes it once a day at night.   2114 No history to support organophosphate toxicity driving bradycardia   2115 Admitted here   2128 Cardiology: Dr. Damon, hold BB, if persistently jarrett then consider transfer.     Procedures             Critical Care time:               Results for orders placed or performed during the hospital encounter of 04/10/22 (from the past 24 hour(s))   Basic metabolic panel   Result Value Ref Range    Sodium 139 133 - 144 mmol/L    Potassium 3.7 3.4 - 5.3 mmol/L    Chloride 108 94 - 109 mmol/L    Carbon Dioxide (CO2) 28 20 - 32 mmol/L    Anion Gap 3 3 - 14 mmol/L    Urea Nitrogen 25 7 - 30 mg/dL    Creatinine 1.01 0.66 - 1.25 mg/dL    Calcium 8.9 8.5 - 10.1 mg/dL    Glucose 98 70 - 99 mg/dL    GFR Estimate 77 >60 mL/min/1.73m2   Magnesium   Result Value Ref Range    Magnesium 2.4 (H) 1.6 - 2.3  mg/dL   Troponin I   Result Value Ref Range    Troponin I High Sensitivity 11 <79 ng/L   CBC with platelets differential    Narrative    The following orders were created for panel order CBC with platelets differential.  Procedure                               Abnormality         Status                     ---------                               -----------         ------                     CBC with platelets and d...[[id number]]  Abnormal            Final result                 Please view results for these tests on the individual orders.   CBC with platelets and differential   Result Value Ref Range    WBC Count 4.4 4.0 - 11.0 10e3/uL    RBC Count 3.98 (L) 4.40 - 5.90 10e6/uL    Hemoglobin 14.0 13.3 - 17.7 g/dL    Hematocrit 40.4 40.0 - 53.0 %     (H) 78 - 100 fL    MCH 35.2 (H) 26.5 - 33.0 pg    MCHC 34.7 31.5 - 36.5 g/dL    RDW 13.2 10.0 - 15.0 %    Platelet Count 137 (L) 150 - 450 10e3/uL    % Neutrophils 60 %    % Lymphocytes 26 %    % Monocytes 10 %    % Eosinophils 3 %    % Basophils 1 %    % Immature Granulocytes 0 %    NRBCs per 100 WBC 0 <1 /100    Absolute Neutrophils 2.7 1.6 - 8.3 10e3/uL    Absolute Lymphocytes 1.1 0.8 - 5.3 10e3/uL    Absolute Monocytes 0.4 0.0 - 1.3 10e3/uL    Absolute Eosinophils 0.1 0.0 - 0.7 10e3/uL    Absolute Basophils 0.0 0.0 - 0.2 10e3/uL    Absolute Immature Granulocytes 0.0 <=0.4 10e3/uL    Absolute NRBCs 0.0 10e3/uL   Extra Tube    Narrative    The following orders were created for panel order Extra Tube.  Procedure                               Abnormality         Status                     ---------                               -----------         ------                     Extra Blue Top Tube[[id number]]                              Final result               Extra Red Top Tube[[id number]]                               Final result               Extra Green Top (Lithium...[[id number]]                      Final result                 Please view results for these  tests on the individual orders.   Extra Blue Top Tube   Result Value Ref Range    Hold Specimen JIC    Extra Red Top Tube   Result Value Ref Range    Hold Specimen JIC    Extra Green Top (Lithium Heparin) ON ICE   Result Value Ref Range    Hold Specimen JIC    TSH with free T4 reflex   Result Value Ref Range    TSH 3.31 0.40 - 4.00 mU/L   Asymptomatic COVID-19 Virus (Coronavirus) by PCR Nasopharyngeal    Specimen: Nasopharyngeal; Swab   Result Value Ref Range    SARS CoV2 PCR Negative Negative    Narrative    Testing was performed using the Xpert Xpress SARS-CoV-2 Assay on the   Cepheid Gene-Xpert Instrument Systems. Additional information about   this Emergency Use Authorization (EUA) assay can be found via the Lab   Guide. This test should be ordered for the detection of SARS-CoV-2 in   individuals who meet SARS-CoV-2 clinical and/or epidemiological   criteria. Test performance is unknown in asymptomatic patients. This   test is for in vitro diagnostic use under the FDA EUA for   laboratories certified under CLIA to perform high complexity testing.   This test has not been FDA cleared or approved. A negative result   does not rule out the presence of PCR inhibitors in the specimen or   target RNA in concentration below the limit of detection for the   assay. The possibility of a false negative should be considered if   the patient's recent exposure or clinical presentation suggests   COVID-19. This test was validated by [facility] laboratory. This laboratory is certified under the Clinical Laboratory Improve  ment Amendments (CLIA) as qualified to perform high complexity testing.       Medications - No data to display    Assessments & Plan (with Medical Decision Making)     I have reviewed the nursing notes.    I have reviewed the findings, diagnosis, plan and need for follow up with the patient.      New Prescriptions    No medications on file       Final diagnoses:   Symptomatic bradycardia        4/10/2022   [facility]     Efe Chacko MD  04/10/22 [id number]

## 2022-04-11 NOTE — CARE PLAN
Prior to Admission Medication Reconciliation:     Medications added:   [] None  [x] As listed below:    Omega-3- reports he is eating sardines right now instead of taking the supplements    Asa- takes a baby asa every evening    Medications deleted:   [x] None  [] As listed below:    Medications marked for review/removal by attending:  [x] None  [] As listed below:    Changes made to existing medications:   [] None  [] Updated strengths and frequencies to most current  [x] As listed below:    Pt takes his daily meds at suppertime    Last times/dates taken verified with patient:  [] Yes- completed myself  [] Prepared PTA medlist for review only. (will not be available to review personally)  [] Did not review with patient. Rx verification only. Review completed by nursing.    [x] Nurse completed no changes made (double checked entries)- pt discharging  [] Unable to review with patient at this time:  [] Nurse completed/changes made:     Allergies listed at another location:  []Allergies match allergies listed in Epic  []Other allergies listed:    Allergy review:    [x]Did not review: reviewed by nursing  []Did not review: pt unable at this time  []Patient/MAR verified NKDA  []Patient/MAR verified current existing allergies: no changes made  []Patient confirmed current existing allergies and new allergies added:    Medication reconciliation sources:   [x]Patient  []Patient family member/emergency contact: **  [][facility] Report Review  []Epic Chart Review  [x]Care Everywhere review:  Medication Sig   [x]omega-3 fatty acid (OMEGA 3) 1000 MG capsule   Take 2 Caps by mouth.   [x]valsartan (Diovan) 80 MG tablet   Take 1 Tablet by mouth one time a day.   [x]rosuvastatin (Crestor) 20 MG tablet    Indications: Mixed hyperlipidemia Take 1 Tablet by mouth one time a day.   [x]nitroglycerin (Nitrostat) 0.4 MG sublingual tablet   Place 1 Tablet under the tongue as needed for Chest pain. Do not crush; maximum of 3 doses in 15  minutes.   [x]metoprolol succinate (Toprol-XL) 25 MG 24 hour extended-release tablet   Take 1 Tablet by mouth one time a day. Do not crush or chew.   [x]apixaban (Eliquis) 5 MG tablet   Take 1 Tablet by mouth two times a day. Please call [phone] to make an appointment for further refills.       []Pharmacy med list: **  []Pharmacy phone call  [x]Outside meds dispense report: see below  []Nursing home or Assisted Living MAR:  []Other: **    Pharmacy desired at discharge: [facility]    Is patient on coumadin?  [x]No      Requests for consultation by provider or pharmacist:   [x] Patient understands why all of their meds were prescribed and how to take them. No questions.   [] Managing party has no questions.   [] Patient/ managing party has questions about the following:  [] Did not review with patient. Cannot assess.     Fill dates and reported compliancy:  [x] Fill dates coincide with reported compliancy for all/most maintenance meds.   [] Fill dates do not coincide with compliancy with maintenance meds. See notes in PTA medlist and in comments.    [] Fill dates do not coincide with the following medications but pt reports compliancy:  [] Did not review with patient. Cannot assess.     Historian accuracy:  [] Excellent- alert and oriented, understands why meds were prescribed and how to take, able to answer specifics  [x] Good- alert and oriented, understands why meds were prescribed and how to take, some confusion   [] Fair- alert and oriented, doesn't know medications without list, cannot answer specifics about medications, but has a decent process for which to take at home  [] Poor- does not know medications, may not have a process to take at home, may be cognitively unable to review at this time  []Medication management done by family member or facility, no concerns about historian accuracy.   [] Did not review with patient. Cannot assess.     Medication Management:  [x] Manages meds independently  [] Family  member/ other party manages meds/assists:  [] Meds managed by staff at facility  [] Meds set up by home care, family/other party helps administer  [] Meds set up by home care, self administers  [] Did not review with patient. Cannot assess.     Other medications aside from PTA:  [x] Denies taking any medications aside from those listed in PTA meds  [] Reports taking another medication(s) but cannot recall the name(s)  [] Refuses to say.  [] Did not review with patient. Cannot assess.     Comments: doesn't know his medications well but has a good process at home.     Tati Wilson on 4/11/2022 at 7:18 AM       Discrepancies: [x] No []Not Applicable []Yes: listed below    Issues completing PTA medication reconciliation:  [] On hold for a long time  [] Waited for a call back  [] Fax didn't come through  [] Fax took a long time  [] Other:    Notifying appropriate party of changes/additions/discrepancies:  []No pertinent changes made, notification not necessary.   [x] Notified attending provider via text page/phone call  [] Notified attending provider in person  [] Notified pharmacy  [] Notified nurse  [] Medications have not been reconciled by a provider yet, notification not necessary  [] Pt is not admitted to floor yet, PTA meds completed before admission.       Medications Prior to Admission   Medication Sig Dispense Refill Last Dose     apixaban ANTICOAGULANT (ELIQUIS) 5 MG tablet Take 5 mg by mouth in the morning and 5 mg in the evening.   4/10/2022 at 0700     aspirin 81 MG EC tablet Take 81 mg by mouth daily (with dinner)   Past Week at Unknown time     fish oil-omega-3 fatty acids 1000 MG capsule Take 2 g by mouth in the morning.   Unknown at Unknown time     metoprolol succinate ER (TOPROL-XL) 25 MG 24 hr tablet Take 25 mg by mouth daily (with dinner) . Do not crush or chew.   4/9/2022 at 1900     nitroGLYcerin (NITROSTAT) 0.4 MG sublingual tablet Place 0.4 mg under the tongue every 5 minutes as needed for chest  pain . Do not crush; maximum of 3 doses in 15 minutes.   Unknown at Unknown time     rosuvastatin (CRESTOR) 20 MG tablet Take 20 mg by mouth daily (with dinner)   4/9/2022 at 1900     valsartan (DIOVAN) 80 MG tablet Take 80 mg by mouth daily (with dinner)   4/9/2022 at 1900           Medication Dispense History (from 4/11/2021 to 4/10/2022)  Expand All  Collapse All    Apixaban     Dispensed Days Supply Quantity Provider Pharmacy   ELIQUIS 5MG         TAB 02/08/2022 90 180 Units [id number] ...   ELIQUIS 5MG         TAB 10/30/2021 90 180 Units [id number] ...   ELIQUIS 5MG         TAB 07/22/2021 90 180 Units [facility] ...        Metoprolol Succinate     Dispensed Days Supply Quantity Provider Pharmacy   METOPROLOL ER 25MG  TAB 02/14/2022 90 90 Units [id number] ...   METOPROLOL ER 25MG  TAB 11/09/2021 90 90 Units [facility] ...   METOPROLOL ER 25MG  TAB 07/22/2021 90 90 Units [facility] ...        Nitroglycerin     Dispensed Days Supply Quantity Provider Pharmacy   NITROGLYCERIN 0.4 MG SUBL 05/17/2021 30 25 Units [id number] ...        Rosuvastatin Calcium     Dispensed Days Supply Quantity Provider Pharmacy   ROSUVASTATIN 20MG TAB [facility]21[facility]2022 [facility] ...   ROSUVASTATIN 20MG TAB 10/13/2021 90 90 Units [facility] ...   ROSUVASTATIN CALCIUM 20 MG TABS 05/21/2021 90 90 tablet [facility] ...        Valsartan     Dispensed Days Supply Quantity Provider Pharmacy   VALSARTAN 80MG TAB 02/10/2022 90 [facility] ...   VALSARTAN 80MG TAB 10/30/2021 90 [facility] ...   VALSARTAN 80MG TAB 07/22/2021 90 90 Units [id number] ...        Disclaimer    Certain dispenses may not be  available or accurate in this report, including over-the-counter medications, low cost prescriptions, prescriptions paid for by the patient or non-participating sources, or errors in insurance claims information. The provider should independently verify medication history with the patient.    External Sources

## 2022-04-11 NOTE — ED NOTES
Per provider, patient up and ambulates to bathroom. Patient states that he feels lightheaded when he is walking. Gait is slightly unsteady. Provider is updated.

## 2022-04-11 NOTE — ED TRIAGE NOTES
Pt reports feeling light headed. Pt also reports checking his pulse at home and it was in the 30's.    Gómez Liz, MSN, RN on 4/10/2022 at 8:33 PM

## 2022-04-11 NOTE — DISCHARGE SUMMARY
[facility]    Discharge Summary  Hospitalist    Date of Admission:  4/10/2022  Date of Discharge:  4/11/2022  Discharging Provider: Jamir Fuchs MD  Date of Service (when I saw the patient): 04/11/22    Discharge Diagnoses   Active Problems:    Symptomatic bradycardia    Coronary artery disease involving native coronary artery of native heart without angina pectoris    Impaired fasting glucose    Dyslipidemia      History of Present Illness   Devante Vital is an 76 year old male who presented with lightheadedness.  Please see admission H+P for additional details.    Hospital Course   Devante Vital was admitted on 4/10/2022.  76-year-old male with history of paroxysmal atrial fibrillation on Eliquis, history of coronary artery disease status post PCI in 2010, hypertension who presents with lightheadedness and found to be bradycardic sustained in the 30s.  Patient does take metoprolol XL 25 mg daily, last dose was 4/9/2022.  Patient was recommended to be admitted here for observation to see if holding beta-blocker alone would improve him, however it has not at this time.  TSH within normal limits, troponin negative, EKG negative for ischemia.  Patient has no chest pain or shortness of breath to suggest MI.  Patient is sustained in the 40s.  He has been on bedrest here.  Blood pressures been good, however with symptomatic bradycardia, he will need pacemaker considerations.  [facility] for cardiology evaluation.    Jamir Fuchs MD      Significant Results and Procedures   See below    Pending Results   These results will be followed up by Jed Rodríguez    Unresulted Labs Ordered in the Past 30 Days of this Admission     No orders found for last 31 day(s).          Code Status   Full Code       Primary Care Physician   JED RODRÍGUEZ    Physical Exam   Temp: 96.8  F (36  C) Temp src: Tympanic BP: 115/68  (Rechecked as prev BP appeared to be false low) Pulse: (!) 42   Resp: 12 SpO2: 94 % O2 Device: None (Room air)    Vitals:    04/10/22 2330   Weight: 75.3 kg (166 lb 0.1 oz)     Vital Signs with Ranges  Temp:  [96.8  F (36  C)-98  F (36.7  C)] 96.8  F (36  C)  Pulse:  [37-47] 42  Resp:  [12-24] 12  BP: ()/(64-80) 115/68  SpO2:  [92 %-98 %] 94 %  I/O last 3 completed shifts:  In: 120 [P.O.:120]  Out: 400 [Urine:400]    Constitutional: AA, NAD  Eyes: PERRLA, no injection, no icterus  HEENT: atraumatic, normocephalic  Respiratory: CTA b/l  Cardiovascular: S1 S2 regular, bradycardic, no murmurs  GI: soft, NT, ND, + bowel sounds  Lymph/Hematologic: no palpable lymphadenopathy  Skin: no rashes, no lesions  Musculoskeletal: No edema, good tone, no deformities  Neurologic: oriented x 3, no focal deficits  Psychiatric: appropriate affect      Discharge Disposition   Transferred to [facility]  Condition at discharge: Guarded    Consultations This Hospital Stay   None    Time Spent on this Encounter   IJamir MD, personally saw the patient today and spent greater than 30 minutes discharging this patient.    Discharge Orders   No discharge procedures on file.  Discharge Medications   Current Discharge Medication List      CONTINUE these medications which have NOT CHANGED    Details   apixaban ANTICOAGULANT (ELIQUIS) 5 MG tablet Take 5 mg by mouth in the morning and 5 mg in the evening.      fish oil-omega-3 fatty acids 1000 MG capsule Take 2 g by mouth in the morning.      metoprolol succinate ER (TOPROL-XL) 25 MG 24 hr tablet Take 25 mg by mouth in the morning. . Do not crush or chew.      nitroGLYcerin (NITROSTAT) 0.4 MG sublingual tablet Place 0.4 mg under the tongue every 5 minutes as needed for chest pain . Do not crush; maximum of 3 doses in 15 minutes.      rosuvastatin (CRESTOR) 20 MG tablet Take 20 mg by mouth in the morning.      valsartan (DIOVAN) 80 MG tablet Take 80 mg by mouth in the morning.            Allergies   Allergies   Allergen Reactions     Lisinopril Cough     Data   Most Recent 3 CBC's:  Recent Labs   Lab Test 04/10/22  2042 04/17/20  1832   WBC 4.4 7.1   HGB 14.0 14.5   * 97   * 189      Most Recent 3 BMP's:  Recent Labs   Lab Test 04/11/22  0435 04/11/22  0047 04/10/22  2042 04/17/20  1832   NA  --   --  139 141   POTASSIUM  --   --  3.7 3.6   CHLORIDE  --   --  108 112*   CO2  --   --  28 24   BUN  --   --  25 22   CR  --   --  1.01 1.17   ANIONGAP  --   --  3 5   SARAH  --   --  8.9 8.2*   GLC 89 108* 98 147*     Most Recent 2 LFT's:  Recent Labs   Lab Test 04/17/20 1832   AST 37   ALT 41   ALKPHOS 70   BILITOTAL 0.7     Most Recent INR's and Anticoagulation Dosing History:  Anticoagulation Dose History     Recent Dosing and Labs Latest Ref Rng & Units 4/17/2020    INR 0.86 - 1.14 1.04        Most Recent 3 Troponin's:No lab results found.  Most Recent Cholesterol Panel:No lab results found.  Most Recent 6 Bacteria Isolates From Any Culture (See EPIC Reports for Culture Details):No lab results found.  Most Recent TSH, T4 and A1c Labs:  Recent Labs   Lab Test 04/10/22  2042   TSH 3.31     Results for orders placed or performed during the hospital encounter of 04/10/22   XR Chest Port 1 View    Narrative    PROCEDURE: XR CHEST PORT 1 VIEW 4/10/2022 8:59 PM    HISTORY: lightheaded    COMPARISONS: 2/24/2021.    TECHNIQUE: Single portable view.    FINDINGS: Heart is stable in size. There is ectasia of the aorta.  Lungs are clear and no pleural effusion is seen.    There are right posterolateral rib fractures, new since the prior  exam. There is no pneumothorax. Degenerative changes seen in the  shoulders.         Impression    IMPRESSION: Right posterolateral rib fractures. Otherwise no  significant interval change.    PRISCILLA RIDDLE MD         SYSTEM ID:  [id number]

## 2022-04-11 NOTE — SIGNIFICANT EVENT
Significant Event Note    Time of event: 1:46 AM April 11, 2022    Description of event:  Bradycardia, but asymptomatic.     Plan:  Continue monitoring.     Discussed with: bedside nurse    Mervin Tang MD

## 2022-04-11 NOTE — ED NOTES
States that a couple hours ago while he was sitting, he developed lightheadedness/dizziness. Denies any other symptoms. States he did check his blood pressure and it was normal for him. States he started checking his pulse rate and it was in the 40's and decreased to upper 30's. States he has had episodes before of slower heart rate, but it never lasted this long. States he normally runs 50-55.

## 2022-04-11 NOTE — H&P
[facility]    History and Physical - Hospitalist Service       Date of Admission:  4/10/2022    Assessment & Plan      Devante Vital is a 76 year old male admitted on 4/10/2022. He presents to ED with lightheadedness, dizziness. He has h/o CAD (s/p PCI RCA and LAD 2010), and a few weeks prior to admission started experiencing lightheadedness which is associated bradycardia. He is on small dose 25 mg toprol. Cardiology consulted. No transfer recommended. ICU monitor and holding BB. If bradycardia persist, will transfer for evaluation of PM implantation. No falls associated with bradycardia, but severe lightheadedness (documented and observed in ED when we try to ambulate him to the bathroom).     Hospital problems:   1. Symptomatic bradycardia. Will admit to ICU. Pacing pads applied. Bedside commode. Hold beta blockers. If bradycardia persist, will transfer.   2. CAD: s/p PCI 2010. No recent ischemia. Monitor. Troponin negative.  3. Fasting glucose intolerance. Will monitor.   4. HTN: will monitor.  5. Chronic anticoagulation: on xarelto for afib. He is NSR bradycardia this admission. Continue xarelto.     Diet: Combination Diet 2 gm NA Diet    DVT Prophylaxis: DOAC  Chavira Catheter: Not present  Central Lines: None  Cardiac Monitoring: ACTIVE order. Indication: Bradycardias (48 hours)  Code Status: Full Code      Clinically Significant Risk Factors Present on Admission               # Coagulation Defect: home medication list includes an anticoagulant medication        Disposition Plan   Expected Discharge: tomorrow or transfer.  Anticipated discharge location: Home vs transfer.   The patient's care was discussed with the patient, wife, ED physician and charge nurse. .    Mervin Tang MD  Hospitalist Service  [facility]  Securely message with the Vocera Web Console (learn more here)  Text page via [employer] Paging/Directory  "        ______________________________________________________________________    Chief Complaint   Dizziness.     History is obtained from the patient, electronic health record and emergency department physician    History of Present Illness   Devante Vital is a 76 year old male who has h/o CAD, s/p PCI, afib, chronic anticoagulation, who presents to ED with dizziness. Describes as lightheadedness, more prominent with activity. No chest pain, no dyspnea. First episode he noticed a few weeks ago. Was short lasting, spontaneously resolving. Today's episode was longer lasting and he was feeling like \"drunken \". When he checked his BP, he also noticed that his HR is low 40's. No falls.   He presents to ED with bradycardia and NSR. No high grade block on ekg or tele. His AV soni blocker is only metoprolol. The most recent visits to cardiology and to PcP revealed his HR was low 50's.   ED blood work targeted to discover bradycardia cause - showed normal TSH, normal K and not elevated troponin.   ED events. I had long discussion with patient regarding is observation admission status. He and his wife were very concerned about hospital charges being observation. We tried to reach his supplemental insurance ([employer] cross and blue shield). Nobody was able to answer. He wanted to go home. I was against going home and advised ambulation in ED. When he tried to go to the bathroom (50 feet) he was dizzy. He agreed to stay, eventually. I will allow him to take his own Apixaban.  Review of Systems    12 points of ROS obtained. Pertinent positives and negatives included in HPI. The rest is negative.     Past Medical History    I have reviewed this patient's medical history and updated it with pertinent information if needed.   As per HPI.     Past Surgical History   I have reviewed this patient's surgical history and updated it with pertinent information if needed.  PCI.     Social History   I have reviewed this patient's " social history and updated it with pertinent information if needed.  Remote smoker, .     Family History     Not relevant to his chief complaint.     Prior to Admission Medications   Prior to Admission Medications   Prescriptions Last Dose Informant Patient Reported? Taking?   apixaban ANTICOAGULANT (ELIQUIS) 5 MG tablet 4/10/2022 at 0700  Yes Yes   Sig: Take 5 mg by mouth   metoprolol succinate ER (TOPROL-XL) 25 MG 24 hr tablet 4/9/2022 at 1900  Yes Yes   Sig: Take 25 mg by mouth   nitroGLYcerin (NITROSTAT) 0.4 MG sublingual tablet Unknown at Unknown time  Yes Yes   Sig: DISSOLVE ONE TABLET UNDER THE TONGUE EVERY 5 MINUTES AS NEEDED FOR CHEST PAIN.  DO NOT EXCEED A TOTAL OF 3 DOSES IN 15 MINUTES   rosuvastatin (CRESTOR) 40 MG tablet 4/9/2022 at 1900  Yes Yes   Sig: TAKE 1/2 (ONE-HALF) TABLET BY MOUTH ONCE DAILY   valsartan (DIOVAN) 80 MG tablet 4/9/2022 at 1900  Yes Yes   Sig: Take 80 mg by mouth      Facility-Administered Medications: None     Allergies   Allergies   Allergen Reactions     Lisinopril Cough       Physical Exam   Vital Signs: Temp: 97.4  F (36.3  C) Temp src: Oral BP: 122/77 Pulse: (!) 45   Resp: 16 SpO2: 94 % O2 Device: None (Room air)    Weight: 0 lbs 0 oz  General: not in distress.   NC/AT. MMM  Neck: no JVD  Cardiac: regular, bradycardic. No S3  Lungs; clear bilaterally  Abd; soft, not tender.   : No Chavira  Neurological: non-focal, normal muscle tone, normal sensory exam, not ataxic.  Psychiatric. Awake, alert, oriented x 4.  Skin: warm, dry, no rash.   Data   Data reviewed today: I reviewed all medications, new labs and imaging results over the last 24 hours. I personally reviewed the EKG tracing showing NSR, bradycardia and the chest x-ray image(s) showing no acute cardiopulmonary process, possibly old 6 and 7th rib fracture (age undetermined).    Recent Labs   Lab 04/10/22  2042   WBC 4.4   HGB 14.0   *   *      POTASSIUM 3.7   CHLORIDE 108   CO2 28   BUN 25   CR  1.01   ANIONGAP 3   SARAH 8.9   GLC 98     4.4    \    14.0    /    137 (L)   N 60    L N/A    139    108    25 /   ------------------------------------ 98   ALT N/A   AST N/A   AP N/A   ALB N/A   Ca 8.9  3.7    28    1.01 \    % RETIC N/A    LDH N/A  Troponin N/A    BNP N/A    CK N/A  INR N/A   PTT N/A    D-dimer N/A    Fibrinogen N/A    Antithrombin N/A  Ferritin N/A  CRP N/A    IL-6 N/A  No results found for this or any previous visit (from the past 24 hour(s)).

## 2022-04-11 NOTE — PLAN OF CARE
"A/O X2, unsure of date. Forgetful as per wife. BETTINA, 3mm, brisk. Denies chest pain or pain of any other origin. SB, HR seen as low as 35, most often high 30's, low 40's. Other vitals stable, slightly hypotensive this am, however maintaining adequate MAPs. PPP and strong. Reports feeling dizzy with ambulation but not when laying in bed has not been up since ambulating from ER stretcher). Chest is clear and denies SOB. Pt describes that at times he feels as though he \"isnt breathing\" and that he has to remind himself to take a deep breath (this is not new for him and says he has brought this up to his provider). Noted to briefly drop SPO2 into the high 80's while sleeping, unaware of any history of sleep apnea. Voiding in urinal with no issues. Small pinpoint hole noted to L coccyx, pale, and no redness or drainage noted, pt states he does not think it is new.     Face to face report given with opportunity to observe patient.    Report given to Lucila Alfaro RN   4/11/2022  7:16 AM    "
Eliquis 5 mg tablet held this morning per providers orders.   
Goal Outcome Evaluation:    Patient being transferred to cardiology at [facility].     
Nurse to Nurse report given to Jina LAFLEUR at [place] at [phone]. Patient going to [facility], bed 2. Awaiting [place] ambulance for patient transfer to [place]. Wife at bedside.     
Patient left with [facility] ambulance and headed to [facility].     
[facility] Inpatient Admission Note:    Patient admitted to 3124/3124-1 at approximately 2330 via cart accompanied by spouse from emergency room . Report received from Joseph in SBAR format at 2315 via telephone. Patient ambulated to bed via self.. Patient is alert and oriented X 2, denies pain; rates at 0 on 0-10 scale.  Patient oriented to room, unit, hourly rounding, and plan of care. Explained admission packet and patient handbook with patient bill of rights brochure. Will continue to monitor and document as needed.     Inpatient Nursing criteria listed below was met:    Health care directives status obtained and documented: No    Patient identifies a surrogate decision maker: No     Clergy visit ordered if patient requests: No    Skin issues/needs documented: Yes    Isolation Patient: no     Education given, correct sign in place and documentation row added to PCS:  Yes    Fall Prevention Yes: Care plan updated, education given and documented, sticker and magnet in place: Yes    Care Plan initiated: Yes    Education Documented (including assessment): Yes    Patient has discharge needs : No If yes, please explain      Pt A/O x2 (not to date) on arrival. HR low 40's otherwise VSS and on RA. Denies pain. Pts wife present on admission and brought patients meds. Pt is for ICU observations.   
Wife took patient's home medications, clothing and boots. Patient taking his glasses cellphone  and hair brush to [facility] with him.       
present

## 2022-04-11 NOTE — ED NOTES
Bed: ED10a  Expected date: 4/6/22  Expected time:   Means of arrival:   Comments:  Critical Patient

## 2022-07-12 ENCOUNTER — LAB REQUISITION (OUTPATIENT)
Dept: LAB | Facility: HOSPITAL | Age: 77
End: 2022-07-12
Payer: MEDICARE

## 2022-07-12 DIAGNOSIS — D53.9 NUTRITIONAL ANEMIA, UNSPECIFIED: ICD-10-CM

## 2022-07-12 LAB
RETICS # AUTO: 0.11 10E6/UL (ref 0.03–0.1)
RETICS/RBC NFR AUTO: 2.8 % (ref 0.5–2)

## 2022-07-12 PROCEDURE — 85045 AUTOMATED RETICULOCYTE COUNT: CPT | Performed by: INTERNAL MEDICINE

## 2022-09-01 ENCOUNTER — HOSPITAL ENCOUNTER (EMERGENCY)
Facility: HOSPITAL | Age: 77
Discharge: HOME | End: 2022-09-01
Attending: NURSE PRACTITIONER | Admitting: NURSE PRACTITIONER
Payer: MEDICARE

## 2022-09-01 ENCOUNTER — APPOINTMENT (OUTPATIENT)
Dept: GENERAL RADIOLOGY | Facility: HOSPITAL | Age: 77
End: 2022-09-01
Attending: NURSE PRACTITIONER
Payer: MEDICARE

## 2022-09-01 VITALS
DIASTOLIC BLOOD PRESSURE: 66 MMHG | RESPIRATION RATE: 16 BRPM | HEART RATE: 62 BPM | OXYGEN SATURATION: 95 % | TEMPERATURE: 100.6 F | SYSTOLIC BLOOD PRESSURE: 109 MMHG

## 2022-09-01 DIAGNOSIS — U07.1 INFECTION DUE TO 2019 NOVEL CORONAVIRUS: ICD-10-CM

## 2022-09-01 PROCEDURE — G0463 HOSPITAL OUTPT CLINIC VISIT: HCPCS | Mod: 25

## 2022-09-01 PROCEDURE — 71045 X-RAY EXAM CHEST 1 VIEW: CPT

## 2022-09-01 PROCEDURE — G0463 HOSPITAL OUTPT CLINIC VISIT: HCPCS

## 2022-09-01 PROCEDURE — 99213 OFFICE O/P EST LOW 20 MIN: CPT | Performed by: NURSE PRACTITIONER

## 2022-09-01 RX ORDER — ALBUTEROL SULFATE 90 UG/1
2 AEROSOL, METERED RESPIRATORY (INHALATION) EVERY 6 HOURS PRN
Qty: 18 G | Refills: 0 | Status: SHIPPED | OUTPATIENT
Start: 2022-09-01

## 2022-09-01 ASSESSMENT — ENCOUNTER SYMPTOMS
COUGH: 1
ALLERGIC/IMMUNOLOGIC NEGATIVE: 1
PSYCHIATRIC NEGATIVE: 1
SORE THROAT: 1
WHEEZING: 0
GASTROINTESTINAL NEGATIVE: 1
APPETITE CHANGE: 1
MUSCULOSKELETAL NEGATIVE: 1
SHORTNESS OF BREATH: 0
HEMATOLOGIC/LYMPHATIC NEGATIVE: 1
FATIGUE: 1
ENDOCRINE NEGATIVE: 1
EYES NEGATIVE: 1
CARDIOVASCULAR NEGATIVE: 1
FEVER: 1
HEADACHES: 1

## 2022-09-01 ASSESSMENT — ACTIVITIES OF DAILY LIVING (ADL): ADLS_ACUITY_SCORE: 35

## 2022-09-01 NOTE — ED TRIAGE NOTES
Pt states he has had covid for 2 days. Pt states he came to be seen today because of right ear pain radiating to right jaw area.

## 2022-09-01 NOTE — DISCHARGE INSTRUCTIONS
If increased SOB, difficulty breathing or any concerns return here  If you decide you want treatment call your provider and see if you qualify for treatment

## 2022-09-01 NOTE — ED PROVIDER NOTES
History     Chief Complaint   Patient presents with     Otalgia     The history is provided by the patient.     Devante Vital is a 76 year old male who presents to the  for viral illness. He tested positive for COVID with at home test 8/30/22.  He was not started on any medications for COVID at this time.  He states he received 1 dose of the J&J vaccine. Not recent boosters.  Today he has a fever, right ear pain, he did have right jaw pain early today.  He has tried ibuprofen but does not feel like it helped.      Allergies:  Allergies   Allergen Reactions     Lisinopril Cough       Problem List:    Patient Active Problem List    Diagnosis Date Noted     Symptomatic bradycardia 04/10/2022     Priority: Medium     Coronary artery disease involving native coronary artery of native heart without angina pectoris 04/10/2022     Priority: Medium     Impaired fasting glucose 04/10/2022     Priority: Medium     Dyslipidemia 04/10/2022     Priority: Medium        Past Medical History:    History reviewed. No pertinent past medical history.    Past Surgical History:    History reviewed. No pertinent surgical history.    Family History:    History reviewed. No pertinent family history.    Social History:  Marital Status:   [2]        Medications:    albuterol (PROAIR HFA/PROVENTIL HFA/VENTOLIN HFA) 108 (90 Base) MCG/ACT inhaler  apixaban ANTICOAGULANT (ELIQUIS) 5 MG tablet  aspirin 81 MG EC tablet  fish oil-omega-3 fatty acids 1000 MG capsule  metoprolol succinate ER (TOPROL-XL) 25 MG 24 hr tablet  nitroGLYcerin (NITROSTAT) 0.4 MG sublingual tablet  rosuvastatin (CRESTOR) 20 MG tablet  valsartan (DIOVAN) 80 MG tablet          Review of Systems   Constitutional: Positive for appetite change, fatigue and fever.   HENT: Positive for ear pain and sore throat.    Eyes: Negative.    Respiratory: Positive for cough. Negative for shortness of breath and wheezing.    Cardiovascular: Negative.    Gastrointestinal:  Negative.    Endocrine: Negative.    Genitourinary: Negative.    Musculoskeletal: Negative.    Skin: Negative.    Allergic/Immunologic: Negative.    Neurological: Positive for headaches.   Hematological: Negative.    Psychiatric/Behavioral: Negative.        Physical Exam   BP: 109/66  Pulse: 62  Temp: (!) 100.6  F (38.1  C)  Resp: 16  SpO2: 95 %      Physical Exam  Vitals and nursing note reviewed.   Constitutional:       Appearance: He is ill-appearing.   HENT:      Right Ear: Tympanic membrane, ear canal and external ear normal.      Left Ear: Tympanic membrane, ear canal and external ear normal.      Nose: Nose normal.      Mouth/Throat:      Mouth: Mucous membranes are moist.      Pharynx: No oropharyngeal exudate or posterior oropharyngeal erythema.   Cardiovascular:      Rate and Rhythm: Normal rate.      Pulses: Normal pulses.      Heart sounds: Normal heart sounds.   Pulmonary:      Breath sounds: Examination of the right-middle field reveals rhonchi. Examination of the left-middle field reveals rhonchi. Examination of the right-lower field reveals rhonchi. Examination of the left-lower field reveals rhonchi. Rhonchi present.   Abdominal:      General: Bowel sounds are normal.      Palpations: Abdomen is soft.      Tenderness: There is no abdominal tenderness.   Skin:     Capillary Refill: Capillary refill takes less than 2 seconds.      Comments: Skin is warm to touch    Neurological:      General: No focal deficit present.      Mental Status: He is alert and oriented to person, place, and time.      Comments: Fatigued    Psychiatric:      Comments: Fatigued          ED Course                 Procedures              Critical Care time:  none           Results for orders placed or performed during the hospital encounter of 09/01/22 (from the past 24 hour(s))   XR Chest Port 1 View    Narrative    PROCEDURE:  XR CHEST PORT 1 VIEW    HISTORY: covid positive. .    COMPARISON:  4/10/2022    FINDINGS:    The  "cardiomediastinal contours are stable.  No new focal consolidation, effusion or pneumothorax.  Right-sided rib fractures are redemonstrated.      Impression    IMPRESSION:  Stable portable chest.      AMY DURON MD         SYSTEM ID:  [id number]       Medications - No data to display    Assessments & Plan (with Medical Decision Making)     I have reviewed the nursing notes.    I have reviewed the findings, diagnosis, plan and need for follow up with the patient.  Reviewed chest XR - no pneumonia noted   Provided Devante with a pulse oximeter and instruction on how to use.  Offered COVID treatment he declined. \"I will ride this one out\"  Discussed he is at high risk for severe disease even death.  He is willing to use symptomatic treatment such as inhaler and tylenol.    Educated on use of oxymetry and demonstrated use   Patient verbally educated and given appropriate education sheets for each of the diagnoses and has no questions.  Take OTC motrin or tylenol as directed on the bottle as needed.  Take prescription medications as directed.  Increase fluids, wash hands often.  Sleep in a recliner or with multiple pillows until this has resolved.  Follow up with your provider if symptoms increase or if further concerns develop, return to the ER.    If increased SOB, difficulty breathing or any concerns return here  If you decide you want treatment call your provider and see if you qualify for treatment     New Prescriptions    ALBUTEROL (PROAIR HFA/PROVENTIL HFA/VENTOLIN HFA) 108 (90 BASE) MCG/ACT INHALER    Inhale 2 puffs into the lungs every 6 hours as needed for shortness of breath / dyspnea or wheezing       Final diagnoses:   Infection due to 2019 novel coronavirus       9/1/2022   [facility]     Arlette Adkins APRN CNP  09/01/22 1542    "

## 2025-02-28 ENCOUNTER — APPOINTMENT (OUTPATIENT)
Dept: GENERAL RADIOLOGY | Facility: HOSPITAL | Age: 80
End: 2025-02-28
Attending: PHYSICIAN ASSISTANT
Payer: MEDICARE

## 2025-02-28 ENCOUNTER — HOSPITAL ENCOUNTER (EMERGENCY)
Facility: HOSPITAL | Age: 80
Discharge: HOME | End: 2025-02-28
Attending: PHYSICIAN ASSISTANT
Payer: MEDICARE

## 2025-02-28 VITALS
BODY MASS INDEX: 27.88 KG/M2 | DIASTOLIC BLOOD PRESSURE: 76 MMHG | SYSTOLIC BLOOD PRESSURE: 128 MMHG | WEIGHT: 167.55 LBS | TEMPERATURE: 98.5 F | OXYGEN SATURATION: 94 % | HEART RATE: 71 BPM | RESPIRATION RATE: 16 BRPM

## 2025-02-28 DIAGNOSIS — S20.211A CONTUSION OF RIGHT CHEST WALL, INITIAL ENCOUNTER: ICD-10-CM

## 2025-02-28 DIAGNOSIS — S00.83XA FACIAL CONTUSION, INITIAL ENCOUNTER: ICD-10-CM

## 2025-02-28 DIAGNOSIS — Z53.29 LEFT AGAINST MEDICAL ADVICE: ICD-10-CM
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
## 2025-02-28 PROCEDURE — 99283 EMERGENCY DEPT VISIT LOW MDM: CPT | Performed by: PHYSICIAN ASSISTANT

## 2025-02-28 PROCEDURE — 71045 X-RAY EXAM CHEST 1 VIEW: CPT

## 2025-02-28 PROCEDURE — 99283 EMERGENCY DEPT VISIT LOW MDM: CPT | Mod: 25

## 2025-02-28 ASSESSMENT — ACTIVITIES OF DAILY LIVING (ADL)
ADLS_ACUITY_SCORE: 43

## 2025-02-28 ASSESSMENT — COLUMBIA-SUICIDE SEVERITY RATING SCALE - C-SSRS
2. HAVE YOU ACTUALLY HAD ANY THOUGHTS OF KILLING YOURSELF IN THE PAST MONTH?: NO
6. HAVE YOU EVER DONE ANYTHING, STARTED TO DO ANYTHING, OR PREPARED TO DO ANYTHING TO END YOUR LIFE?: NO
1. IN THE PAST MONTH, HAVE YOU WISHED YOU WERE DEAD OR WISHED YOU COULD GO TO SLEEP AND NOT WAKE UP?: NO

## 2025-02-28 NOTE — ED TRIAGE NOTES
"Pt presents with c/o falling forward this morning on the tar while walking his dog.Pt states he slipped on ice, struck his nose and chest when he fell, denies hitting his head, states it hurts when he takes a deep breath but doesn't hurt when he takes \"half a breath\". Pt does admit to use of blood thinners.        "

## 2025-02-28 NOTE — ED NOTES
Patient wanting to go home. States he has been taking deep breaths and feels that his breathing and pain is getting better. Concerned about radiation with imaging. Did not want PA to listen to lungs when PA attempting to assess. This nurse spoke with patient and discussed concerns due to his presenting symptoms after a fall and trauma to the chest. Patient agreed to 1 view chest XR to assess for any immediate emergency needs. Declines any other imaging or work up for fall including recommended head CT as he did hit his face on the ground and has an abrasion to the bridge of nose and the right side of forehead. Provider aware.

## 2025-03-01 NOTE — ED PROVIDER NOTES
History     Chief Complaint   Patient presents with    Fall     HPI  Devante Vital is a 79 year old male who presents for evaluation after a fall. He states that he slipped on ice and fell hitting his chest and head.  He is on Eliquis.  He states that he has an abrasion to his nose he feels that he has some shortness of breath and having some chest wall pain.  Denies any dizziness or lightheadedness no weakness numbness or tingling he denies any neck pain no back pain no abdominal pain.  He is able to ambulate without difficulty.  Patient denies any hemoptysis.      Allergies:  Allergies   Allergen Reactions    Lisinopril Cough       Problem List:    Patient Active Problem List    Diagnosis Date Noted    Symptomatic bradycardia 04/10/2022     Priority: Medium    Coronary artery disease involving native coronary artery of native heart without angina pectoris 04/10/2022     Priority: Medium    Impaired fasting glucose 04/10/2022     Priority: Medium    Dyslipidemia 04/10/2022     Priority: Medium        Past Medical History:    No past medical history on file.    Past Surgical History:    No past surgical history on file.    Family History:    No family history on file.    Social History:  Marital Status:   [2]        Medications:    albuterol (PROAIR HFA/PROVENTIL HFA/VENTOLIN HFA) 108 (90 Base) MCG/ACT inhaler  apixaban ANTICOAGULANT (ELIQUIS) 5 MG tablet  aspirin 81 MG EC tablet  fish oil-omega-3 fatty acids 1000 MG capsule  metoprolol succinate ER (TOPROL-XL) 25 MG 24 hr tablet  nitroGLYcerin (NITROSTAT) 0.4 MG sublingual tablet  rosuvastatin (CRESTOR) 20 MG tablet  valsartan (DIOVAN) 80 MG tablet          Review of Systems   All other systems reviewed and are negative.      Physical Exam   BP: 128/76  Pulse: 71  Temp: 98.5  F (36.9  C)  Resp: 16  Weight: 76 kg (167 lb 8.8 oz)  SpO2: 94 %      Physical Exam  Constitutional:       General: He is not in acute distress.     Appearance: Normal  appearance. He is normal weight. He is not ill-appearing, toxic-appearing or diaphoretic.   HENT:      Head: Normocephalic.        Right Ear: Hearing, tympanic membrane, ear canal and external ear normal.      Left Ear: Hearing, tympanic membrane, ear canal and external ear normal.      Mouth/Throat:      Mouth: Mucous membranes are moist.      Pharynx: Oropharynx is clear. Uvula midline.   Eyes:      Extraocular Movements: Extraocular movements intact.      Conjunctiva/sclera: Conjunctivae normal.      Pupils: Pupils are equal, round, and reactive to light.   Cardiovascular:      Rate and Rhythm: Normal rate and regular rhythm.   Pulmonary:      Effort: Pulmonary effort is normal. No respiratory distress.      Breath sounds: Normal breath sounds and air entry. No decreased breath sounds, wheezing, rhonchi or rales.   Chest:       Musculoskeletal:         General: Normal range of motion.      Cervical back: Full passive range of motion without pain and normal range of motion.   Skin:     General: Skin is warm and dry.      Coloration: Skin is not jaundiced or pale.   Neurological:      Mental Status: He is alert and oriented to person, place, and time. Mental status is at baseline.      Cranial Nerves: No cranial nerve deficit.   Psychiatric:         Mood and Affect: Mood normal.         ED Course      Discussed with the patient workup.  Recommended that we obtain CT imaging due to head injury age and anticoagulation.  Patient declines.  We had a risk-benefit talk discussed at length my concerns and risks associated with the possibility of having a head bleed given his risk factors.  Patient is understanding of these risks factors and still continues to decline.  I recommended imaging of the neck again for the above 3 and he declines after risk-benefit discussion.  Nursing staff was able to convince the patient to at least obtain a 1 view chest x-ray however he was quite reluctant and hesitant to he would not agree  to any further imaging again understanding risk benefit.  I encouraged the patient to stay for further workup he did declined review of his x-ray did show no acute cardiopulmonary process I did question some rib fractures I spoke with the radiologist after discussion with the radiologist noted that the rib fractures were chronic in nature and not acute the patient did eventually let the department AGAINST MEDICAL ADVICE refusing to sign paperwork.  I did strongly encourage the patient to return to the ER for any neurologic signs or symptoms we discussed red flags for which to return he is understanding.  Procedures                  Results for orders placed or performed during the hospital encounter of 02/28/25 (from the past 24 hours)   XR Chest Port 1 View    Narrative    EXAM: XR CHEST PORT 1 VIEW  LOCATION: [facility]  DATE: 2/28/2025    INDICATION: sob cp from fall  COMPARISON: 9/1/2022      Impression    IMPRESSION: Left pacemaker with lead overlying the right atrium and right ventricle. Heart normal in size. Lungs are clear.       Medications - No data to display    Assessments & Plan (with Medical Decision Making)     I have reviewed the nursing notes.    I have reviewed the findings, diagnosis, plan and need for follow up with the patient.          Discharge Medication List as of 3/1/2025 12:57 AM          Final diagnoses:   Fall, initial encounter   Facial contusion, initial encounter   Left against medical advice   Contusion of right chest wall, initial encounter       2/28/2025   [facility]       Nacho Soto PA-C  03/01/25 [id number]

## 2025-03-01 NOTE — ED NOTES
Patient wanting to leave. Walked out without signing AMA paperwork. Did discuss AMA risks including further illness, injury or death with patient prior to this.